# Patient Record
Sex: MALE | Race: BLACK OR AFRICAN AMERICAN | NOT HISPANIC OR LATINO | Employment: OTHER | URBAN - METROPOLITAN AREA
[De-identification: names, ages, dates, MRNs, and addresses within clinical notes are randomized per-mention and may not be internally consistent; named-entity substitution may affect disease eponyms.]

---

## 2023-03-06 LAB — THYROTROPIN (MIU/L) IN SER/PLAS BY DETECTION LIMIT <= 0.05 MIU/L: 3.04 MIU/L (ref 0.44–3.98)

## 2023-04-08 DIAGNOSIS — E78.5 HYPERLIPIDEMIA, UNSPECIFIED HYPERLIPIDEMIA TYPE: ICD-10-CM

## 2023-04-13 PROBLEM — R19.5 POSITIVE COLORECTAL CANCER SCREENING USING DNA-BASED STOOL TEST: Status: ACTIVE | Noted: 2023-04-13

## 2023-04-13 PROBLEM — M79.676 TOE PAIN: Status: ACTIVE | Noted: 2023-04-13

## 2023-04-13 PROBLEM — M79.89 LEG SWELLING: Status: ACTIVE | Noted: 2023-04-13

## 2023-04-13 PROBLEM — M10.9 ACUTE GOUT: Status: ACTIVE | Noted: 2023-04-13

## 2023-04-13 PROBLEM — R55 SYNCOPE AND COLLAPSE: Status: ACTIVE | Noted: 2023-04-13

## 2023-04-13 PROBLEM — J02.9 SORE THROAT: Status: ACTIVE | Noted: 2023-04-13

## 2023-04-13 PROBLEM — R03.0 WHITE COAT SYNDROME WITHOUT HYPERTENSION: Status: ACTIVE | Noted: 2023-04-13

## 2023-04-13 PROBLEM — E05.90 SUBCLINICAL HYPERTHYROIDISM: Status: ACTIVE | Noted: 2023-04-13

## 2023-04-13 PROBLEM — N52.9 ERECTILE DYSFUNCTION: Status: ACTIVE | Noted: 2023-04-13

## 2023-04-13 PROBLEM — D12.6 HIGH GRADE DYSPLASIA IN COLONIC ADENOMA: Status: ACTIVE | Noted: 2023-04-13

## 2023-04-13 PROBLEM — T78.3XXA ANGIOEDEMA OF LIPS: Status: ACTIVE | Noted: 2023-04-13

## 2023-04-13 PROBLEM — E05.10 TOXIC ADENOMA: Status: ACTIVE | Noted: 2023-04-13

## 2023-04-13 PROBLEM — S43.409A SHOULDER SPRAIN: Status: ACTIVE | Noted: 2023-04-13

## 2023-04-13 PROBLEM — R05.9 COUGH: Status: ACTIVE | Noted: 2023-04-13

## 2023-04-13 PROBLEM — M25.551 PAIN IN RIGHT HIP: Status: ACTIVE | Noted: 2023-04-13

## 2023-04-13 PROBLEM — R97.20 ELEVATED PSA: Status: ACTIVE | Noted: 2023-04-13

## 2023-04-13 PROBLEM — E04.1 THYROID NODULE: Status: ACTIVE | Noted: 2023-04-13

## 2023-04-13 PROBLEM — M75.101 ROTATOR CUFF TEAR, RIGHT: Status: ACTIVE | Noted: 2023-04-13

## 2023-04-13 PROBLEM — R03.0 PREHYPERTENSION: Status: ACTIVE | Noted: 2023-04-13

## 2023-04-13 PROBLEM — E78.5 HYPERLIPEMIA: Status: ACTIVE | Noted: 2023-04-13

## 2023-04-13 PROBLEM — I26.99 PULMONARY EMBOLISM (MULTI): Status: ACTIVE | Noted: 2023-04-13

## 2023-04-13 PROBLEM — M25.519 SHOULDER PAIN: Status: ACTIVE | Noted: 2023-04-13

## 2023-04-13 PROBLEM — E55.9 VITAMIN D DEFICIENCY: Status: ACTIVE | Noted: 2023-04-13

## 2023-04-13 PROBLEM — J32.9 SINUSITIS: Status: ACTIVE | Noted: 2023-04-13

## 2023-04-13 PROBLEM — R79.89 LOW TSH LEVEL: Status: ACTIVE | Noted: 2023-04-13

## 2023-04-13 RX ORDER — ATORVASTATIN CALCIUM 20 MG/1
TABLET, FILM COATED ORAL
Qty: 30 TABLET | Refills: 1 | Status: SHIPPED | OUTPATIENT
Start: 2023-04-13 | End: 2023-05-12 | Stop reason: SDUPTHER

## 2023-05-12 DIAGNOSIS — E78.5 HYPERLIPIDEMIA, UNSPECIFIED HYPERLIPIDEMIA TYPE: ICD-10-CM

## 2023-05-12 RX ORDER — ATORVASTATIN CALCIUM 20 MG/1
TABLET, FILM COATED ORAL
Qty: 30 TABLET | Refills: 2 | Status: SHIPPED | OUTPATIENT
Start: 2023-05-12 | End: 2023-08-14

## 2023-05-27 LAB — THYROTROPIN (MIU/L) IN SER/PLAS BY DETECTION LIMIT <= 0.05 MIU/L: 5.48 MIU/L (ref 0.44–3.98)

## 2023-08-14 DIAGNOSIS — E78.5 HYPERLIPIDEMIA, UNSPECIFIED HYPERLIPIDEMIA TYPE: ICD-10-CM

## 2023-08-14 RX ORDER — ATORVASTATIN CALCIUM 20 MG/1
TABLET, FILM COATED ORAL
Qty: 90 TABLET | Refills: 1 | Status: SHIPPED | OUTPATIENT
Start: 2023-08-14 | End: 2024-03-28

## 2023-08-16 LAB — THYROTROPIN (MIU/L) IN SER/PLAS BY DETECTION LIMIT <= 0.05 MIU/L: 3.09 MIU/L (ref 0.44–3.98)

## 2023-12-08 NOTE — PROGRESS NOTES
"follow up for hypothyroidism. LV with me 06/2023.     History of Present Illness   73 y.o. 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     Subclinical hyperthyroidism.  11/15/2022    -NM uptake scan: hot nodule in right lobe    -DIAZ (25 mCi)    Current regimen: levothyroxine 25 mcg/day (started 06/2023)    Labs from 08/2023  TSH 3.06    Labs from 12/2023  TSH 4.52     ROS  General: no fever or chills  CV: no chest pain   Respiratory: no shortness of breath  MSK: no lower extremity edema  Neuro: no headache or dizziness  See HPI for Endocrine ROS    Physical Exam   height is 1.778 m (5' 10\") and weight is 103 kg (228 lb). His blood pressure is 173/76 and his pulse is 97.   General: not in acute distress  HEENT: ARLENE RIZZO  Thyroid: no goiter, no palpable nodules  Neuro: alert and oriented x 3    Current Outpatient Medications   Medication Sig Dispense Refill    atorvastatin (Lipitor) 20 mg tablet TAKE 1 TABLET BY MOUTH EVERY DAY *PLEASE FOLLOW UP FOR FURTHER REFILLS* 90 tablet 1    levothyroxine (Synthroid, Levoxyl) 50 mcg tablet Take 1 tablet (50 mcg) by mouth once daily. 90 tablet 1     No current facility-administered medications for this visit.       Assessment and Plan  73 y.o. male with hx of subclinical hyperthyroidism 2/2 toxic adenoma s/p DIAZ (11/15/2022) and post-ablative hypothyroidism     1. Post-ablative hypothyroidism  2. Hx of toxic adenoma   TSH: intermittently low since 201, most recently 0.05 in May 2022  10/11/2022  Thyroid US  Right lobe: 3.5 x 2.5 x 2.8 cm, hypoechoic mostly solid  Left lobe: cysts   11/15/2022  -NM uptake scan: large focus of increased uptake in right lobe corresponding to large right lobe nodule on US   -DIAZ (25 mCi)      Current regimen: levothyroxine 25 mcg/day (since 06/2023)    Labs from 5/27/2023 (first elevated TSH after DIAZ)  TSH 5.48     Labs from 08/2023  TSH 3.06    Labs from 12/2023  TSH 4.52    Slightly hypothyroid. Will " increase levothyroxine.    PLAN:  -increase levothyroxine to 50 mcg/day  -repeat TSH in 2 months  -obtain thyroid US     Follow-up in 6 months   Uses [employer].

## 2023-12-11 ENCOUNTER — LAB (OUTPATIENT)
Dept: LAB | Facility: LAB | Age: 73
End: 2023-12-11
Payer: MEDICARE

## 2023-12-11 DIAGNOSIS — E89.0 POSTABLATIVE HYPOTHYROIDISM: ICD-10-CM

## 2023-12-11 LAB — TSH SERPL-ACNC: 4.52 MIU/L (ref 0.44–3.98)

## 2023-12-11 PROCEDURE — 36415 COLL VENOUS BLD VENIPUNCTURE: CPT

## 2023-12-11 PROCEDURE — 84443 ASSAY THYROID STIM HORMONE: CPT

## 2023-12-14 ENCOUNTER — OFFICE VISIT (OUTPATIENT)
Dept: ENDOCRINOLOGY | Facility: CLINIC | Age: 73
End: 2023-12-14
Payer: MEDICARE

## 2023-12-14 VITALS
WEIGHT: 228 LBS | HEART RATE: 97 BPM | HEIGHT: 70 IN | DIASTOLIC BLOOD PRESSURE: 76 MMHG | SYSTOLIC BLOOD PRESSURE: 173 MMHG | BODY MASS INDEX: 32.64 KG/M2

## 2023-12-14 DIAGNOSIS — E04.1 THYROID NODULE: ICD-10-CM

## 2023-12-14 DIAGNOSIS — E89.0 POSTPROCEDURAL HYPOTHYROIDISM: ICD-10-CM

## 2023-12-14 DIAGNOSIS — E89.0 POSTABLATIVE HYPOTHYROIDISM: Primary | ICD-10-CM

## 2023-12-14 PROCEDURE — 1126F AMNT PAIN NOTED NONE PRSNT: CPT | Performed by: STUDENT IN AN ORGANIZED HEALTH CARE EDUCATION/TRAINING PROGRAM

## 2023-12-14 PROCEDURE — 3078F DIAST BP <80 MM HG: CPT | Performed by: STUDENT IN AN ORGANIZED HEALTH CARE EDUCATION/TRAINING PROGRAM

## 2023-12-14 PROCEDURE — 3077F SYST BP >= 140 MM HG: CPT | Performed by: STUDENT IN AN ORGANIZED HEALTH CARE EDUCATION/TRAINING PROGRAM

## 2023-12-14 PROCEDURE — 1159F MED LIST DOCD IN RCRD: CPT | Performed by: STUDENT IN AN ORGANIZED HEALTH CARE EDUCATION/TRAINING PROGRAM

## 2023-12-14 PROCEDURE — 99214 OFFICE O/P EST MOD 30 MIN: CPT | Performed by: STUDENT IN AN ORGANIZED HEALTH CARE EDUCATION/TRAINING PROGRAM

## 2023-12-14 RX ORDER — LEVOTHYROXINE SODIUM 50 UG/1
50 TABLET ORAL DAILY
Qty: 90 TABLET | Refills: 1 | Status: SHIPPED | OUTPATIENT
Start: 2023-12-14 | End: 2024-05-20

## 2023-12-14 NOTE — PATIENT INSTRUCTIONS
Increase levothyroxine to 50 micrograms, once a day.  You can 2 tablets of the 25 micrograms pills until you are finished. The new prescription I sent for you today will be 50 microgram tablets, so you will only need to take 1 tablet of this every morning.  Please blood work done in about 2 months (mid February)  Please schedule thyroid ultrasound    [facility]: Main: [phone]

## 2024-02-12 ENCOUNTER — LAB (OUTPATIENT)
Dept: LAB | Facility: LAB | Age: 74
End: 2024-02-12
Payer: MEDICARE

## 2024-02-12 DIAGNOSIS — E89.0 POSTABLATIVE HYPOTHYROIDISM: ICD-10-CM

## 2024-02-12 LAB — TSH SERPL-ACNC: 3.3 MIU/L (ref 0.44–3.98)

## 2024-02-12 PROCEDURE — 36415 COLL VENOUS BLD VENIPUNCTURE: CPT

## 2024-02-12 PROCEDURE — 84443 ASSAY THYROID STIM HORMONE: CPT

## 2024-02-15 ENCOUNTER — HOSPITAL ENCOUNTER (OUTPATIENT)
Dept: RADIOLOGY | Facility: CLINIC | Age: 74
Discharge: HOME | End: 2024-02-15
Payer: MEDICARE

## 2024-02-15 DIAGNOSIS — E04.1 THYROID NODULE: ICD-10-CM

## 2024-02-15 PROCEDURE — 76536 US EXAM OF HEAD AND NECK: CPT

## 2024-02-15 PROCEDURE — 76536 US EXAM OF HEAD AND NECK: CPT | Performed by: STUDENT IN AN ORGANIZED HEALTH CARE EDUCATION/TRAINING PROGRAM

## 2024-02-16 DIAGNOSIS — E78.5 HYPERLIPIDEMIA, UNSPECIFIED HYPERLIPIDEMIA TYPE: ICD-10-CM

## 2024-03-28 RX ORDER — ATORVASTATIN CALCIUM 20 MG/1
TABLET, FILM COATED ORAL
Qty: 90 TABLET | Refills: 1 | Status: SHIPPED | OUTPATIENT
Start: 2024-03-28

## 2024-05-19 DIAGNOSIS — E89.0 POSTABLATIVE HYPOTHYROIDISM: ICD-10-CM

## 2024-05-20 RX ORDER — LEVOTHYROXINE SODIUM 50 UG/1
50 TABLET ORAL DAILY
Qty: 90 TABLET | Refills: 0 | Status: SHIPPED | OUTPATIENT
Start: 2024-05-20 | End: 2024-06-05 | Stop reason: SDUPTHER

## 2024-05-30 NOTE — PROGRESS NOTES
"follow up for hypothyroidism. LV with me 12/2023.     History of Present Illness   73 y.o. 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     Subclinical hyperthyroidism.  11/15/2022  -NM uptake scan: hot nodule in right lobe  -DIAZ (25 mCi)    Current regimen: levothyroxine 50 mcg/day    Today  -doing well  -still playing [employer] ball regularly    ROS  General: no fever or chills  CV: no chest pain   Respiratory: no shortness of breath  MSK: no lower extremity edema  Neuro: no headache or dizziness  See HPI for Endocrine ROS    Physical Exam   height is 1.778 m (5' 10\") and weight is 105 kg (231 lb). His blood pressure is 144/73 and his pulse is 78.   General: not in acute distress  HEENT: ARLENE RIZZO  Thyroid: no goiter, no palpable nodules  Neuro: alert and oriented x 3    Current Outpatient Medications   Medication Sig Dispense Refill    atorvastatin (Lipitor) 20 mg tablet TAKE 1 TABLET BY MOUTH EVERY DAY *PLEASE FOLLOW UP FOR FURTHER REFILLS* 90 tablet 1    levothyroxine (Synthroid, Levoxyl) 50 mcg tablet TAKE 1 TABLET BY MOUTH ONCE DAILY. 90 tablet 0     No current facility-administered medications for this visit.       Assessment and Plan  73 y.o. male with hx of subclinical hyperthyroidism 2/2 toxic adenoma s/p DIAZ (11/15/2022) and post-ablative hypothyroidism     1. Post-ablative hypothyroidism  2. Hx of toxic adenoma   TSH: intermittently low since 201, most recently 0.05 in May 2022  Thyroid US (10/2022)  Right lobe:   -interpolar; 3.5 x 2.5 x 2.8 cm, hypoechoic mostly solid (hot nodule)  -interpolar; 0.8 x 0.7 x 0.8 cm, hypoechoic, solid  Left lobe: cysts   11/15/2022  -NM uptake scan: large focus of increased uptake in right lobe corresponding to large right lobe nodule on US   -DIAZ (25 mCi)     Current regimen: levothyroxine 50 mcg/day (since 12/2023)    Thyroid US (02/2024):  Right:  -interpolar; 2.3 x 2.1 x 2.0 cm, hypoechoic, solid (hot nodule)  -interpolar; 0.8 x " 0.8 x 0.8 cm, hypoechoic, solid    Labs from 12/2023  TSH 4.52  *increased LT4 from 25 mcg/day to 50 mcg/day    Labs from 02/2024  TSH 3.30    Asymptomatic and doing well.   Will check TSH in August.    PLAN:  -continue levothyroxine 50 mcg/day  -repeat TSH in 2 months (August)  -repeat thyroid US in 02/2025     Follow-up in 8 months  Uses [email or web address].

## 2024-06-05 ENCOUNTER — OFFICE VISIT (OUTPATIENT)
Dept: ENDOCRINOLOGY | Facility: CLINIC | Age: 74
End: 2024-06-05
Payer: MEDICARE

## 2024-06-05 VITALS
BODY MASS INDEX: 33.07 KG/M2 | HEART RATE: 78 BPM | SYSTOLIC BLOOD PRESSURE: 144 MMHG | HEIGHT: 70 IN | WEIGHT: 231 LBS | DIASTOLIC BLOOD PRESSURE: 73 MMHG

## 2024-06-05 DIAGNOSIS — E89.0 POSTABLATIVE HYPOTHYROIDISM: ICD-10-CM

## 2024-06-05 PROCEDURE — 3078F DIAST BP <80 MM HG: CPT | Performed by: STUDENT IN AN ORGANIZED HEALTH CARE EDUCATION/TRAINING PROGRAM

## 2024-06-05 PROCEDURE — 99214 OFFICE O/P EST MOD 30 MIN: CPT | Performed by: STUDENT IN AN ORGANIZED HEALTH CARE EDUCATION/TRAINING PROGRAM

## 2024-06-05 PROCEDURE — 3077F SYST BP >= 140 MM HG: CPT | Performed by: STUDENT IN AN ORGANIZED HEALTH CARE EDUCATION/TRAINING PROGRAM

## 2024-06-05 PROCEDURE — 1159F MED LIST DOCD IN RCRD: CPT | Performed by: STUDENT IN AN ORGANIZED HEALTH CARE EDUCATION/TRAINING PROGRAM

## 2024-06-05 RX ORDER — LEVOTHYROXINE SODIUM 50 UG/1
50 TABLET ORAL DAILY
Qty: 90 TABLET | Refills: 3 | Status: SHIPPED | OUTPATIENT
Start: 2024-06-05

## 2024-06-05 NOTE — PATIENT INSTRUCTIONS
Please have blood work done in early August for your thyroid  Please schedule thyroid ultrasound for end of January (will discuss results with you at your February appointment)    Scheduling for the ultrasound: [phone]

## 2024-06-19 ENCOUNTER — APPOINTMENT (OUTPATIENT)
Dept: ENDOCRINOLOGY | Facility: CLINIC | Age: 74
End: 2024-06-19
Payer: MEDICARE

## 2024-08-31 ENCOUNTER — LAB (OUTPATIENT)
Dept: LAB | Facility: LAB | Age: 74
End: 2024-08-31
Payer: MEDICARE

## 2024-08-31 DIAGNOSIS — E89.0 POSTABLATIVE HYPOTHYROIDISM: ICD-10-CM

## 2024-08-31 LAB — TSH SERPL-ACNC: 4.21 MIU/L (ref 0.44–3.98)

## 2024-08-31 PROCEDURE — 36415 COLL VENOUS BLD VENIPUNCTURE: CPT

## 2024-08-31 PROCEDURE — 84443 ASSAY THYROID STIM HORMONE: CPT

## 2024-09-03 DIAGNOSIS — E89.0 POSTABLATIVE HYPOTHYROIDISM: Primary | ICD-10-CM

## 2024-09-03 RX ORDER — LEVOTHYROXINE SODIUM 75 UG/1
TABLET ORAL
Qty: 90 TABLET | Refills: 3 | Status: SHIPPED | OUTPATIENT
Start: 2024-09-03

## 2024-11-16 ENCOUNTER — LAB (OUTPATIENT)
Dept: LAB | Facility: LAB | Age: 74
End: 2024-11-16
Payer: MEDICARE

## 2024-11-16 DIAGNOSIS — E89.0 POSTABLATIVE HYPOTHYROIDISM: ICD-10-CM

## 2024-11-16 LAB — TSH SERPL-ACNC: 2.79 MIU/L (ref 0.44–3.98)

## 2024-11-16 PROCEDURE — 84550 ASSAY OF BLOOD/URIC ACID: CPT

## 2024-11-16 PROCEDURE — 84443 ASSAY THYROID STIM HORMONE: CPT

## 2024-11-16 PROCEDURE — 36415 COLL VENOUS BLD VENIPUNCTURE: CPT

## 2024-11-16 PROCEDURE — 82306 VITAMIN D 25 HYDROXY: CPT

## 2024-11-16 PROCEDURE — G0103 PSA SCREENING: HCPCS

## 2024-11-16 PROCEDURE — 80053 COMPREHEN METABOLIC PANEL: CPT

## 2024-11-19 ENCOUNTER — APPOINTMENT (OUTPATIENT)
Dept: PRIMARY CARE | Facility: CLINIC | Age: 74
End: 2024-11-19
Payer: MEDICARE

## 2024-11-19 VITALS
SYSTOLIC BLOOD PRESSURE: 150 MMHG | BODY MASS INDEX: 32.64 KG/M2 | HEIGHT: 70 IN | WEIGHT: 228 LBS | TEMPERATURE: 97.9 F | DIASTOLIC BLOOD PRESSURE: 80 MMHG | HEART RATE: 96 BPM

## 2024-11-19 DIAGNOSIS — N50.89 SCROTUM SWELLING: ICD-10-CM

## 2024-11-19 DIAGNOSIS — R53.81 MALAISE: ICD-10-CM

## 2024-11-19 DIAGNOSIS — I26.99 OTHER PULMONARY EMBOLISM WITHOUT ACUTE COR PULMONALE, UNSPECIFIED CHRONICITY (MULTI): ICD-10-CM

## 2024-11-19 DIAGNOSIS — Z12.5 SCREENING PSA (PROSTATE SPECIFIC ANTIGEN): ICD-10-CM

## 2024-11-19 DIAGNOSIS — E55.9 VITAMIN D DEFICIENCY: ICD-10-CM

## 2024-11-19 DIAGNOSIS — E78.2 MIXED HYPERLIPIDEMIA: ICD-10-CM

## 2024-11-19 DIAGNOSIS — E78.5 HYPERLIPIDEMIA, UNSPECIFIED HYPERLIPIDEMIA TYPE: ICD-10-CM

## 2024-11-19 DIAGNOSIS — Z12.11 SCREENING FOR COLON CANCER: Primary | ICD-10-CM

## 2024-11-19 DIAGNOSIS — R97.20 ELEVATED PSA: ICD-10-CM

## 2024-11-19 DIAGNOSIS — R19.5 POSITIVE COLORECTAL CANCER SCREENING USING DNA-BASED STOOL TEST: ICD-10-CM

## 2024-11-19 DIAGNOSIS — J30.9 ALLERGIC RHINITIS, UNSPECIFIED SEASONALITY, UNSPECIFIED TRIGGER: ICD-10-CM

## 2024-11-19 DIAGNOSIS — R03.0 WHITE COAT SYNDROME WITHOUT HYPERTENSION: ICD-10-CM

## 2024-11-19 LAB
25(OH)D3 SERPL-MCNC: 34 NG/ML (ref 30–100)
ALBUMIN SERPL BCP-MCNC: 4.1 G/DL (ref 3.4–5)
ALP SERPL-CCNC: 74 U/L (ref 33–136)
ALT SERPL W P-5'-P-CCNC: 20 U/L (ref 10–52)
ANION GAP SERPL CALC-SCNC: 20 MMOL/L (ref 10–20)
AST SERPL W P-5'-P-CCNC: 43 U/L (ref 9–39)
BILIRUB SERPL-MCNC: 1 MG/DL (ref 0–1.2)
BUN SERPL-MCNC: 12 MG/DL (ref 6–23)
CALCIUM SERPL-MCNC: 9.1 MG/DL (ref 8.6–10.6)
CHLORIDE SERPL-SCNC: 102 MMOL/L (ref 98–107)
CO2 SERPL-SCNC: 25 MMOL/L (ref 21–32)
CREAT SERPL-MCNC: 1.03 MG/DL (ref 0.5–1.3)
EGFRCR SERPLBLD CKD-EPI 2021: 77 ML/MIN/1.73M*2
GLUCOSE SERPL-MCNC: 77 MG/DL (ref 74–99)
POTASSIUM SERPL-SCNC: 4 MMOL/L (ref 3.5–5.3)
PROT SERPL-MCNC: 7.1 G/DL (ref 6.4–8.2)
PSA SERPL-MCNC: 7.13 NG/ML
SODIUM SERPL-SCNC: 143 MMOL/L (ref 136–145)
URATE SERPL-MCNC: 7 MG/DL (ref 4–7.5)

## 2024-11-19 PROCEDURE — 3008F BODY MASS INDEX DOCD: CPT | Performed by: INTERNAL MEDICINE

## 2024-11-19 PROCEDURE — 1124F ACP DISCUSS-NO DSCNMKR DOCD: CPT | Performed by: INTERNAL MEDICINE

## 2024-11-19 PROCEDURE — 1159F MED LIST DOCD IN RCRD: CPT | Performed by: INTERNAL MEDICINE

## 2024-11-19 PROCEDURE — G0438 PPPS, INITIAL VISIT: HCPCS | Performed by: INTERNAL MEDICINE

## 2024-11-19 PROCEDURE — 1170F FXNL STATUS ASSESSED: CPT | Performed by: INTERNAL MEDICINE

## 2024-11-19 PROCEDURE — 3077F SYST BP >= 140 MM HG: CPT | Performed by: INTERNAL MEDICINE

## 2024-11-19 PROCEDURE — 1160F RVW MEDS BY RX/DR IN RCRD: CPT | Performed by: INTERNAL MEDICINE

## 2024-11-19 PROCEDURE — 3079F DIAST BP 80-89 MM HG: CPT | Performed by: INTERNAL MEDICINE

## 2024-11-19 PROCEDURE — 99215 OFFICE O/P EST HI 40 MIN: CPT | Performed by: INTERNAL MEDICINE

## 2024-11-19 RX ORDER — FLUTICASONE PROPIONATE 50 MCG
1 SPRAY, SUSPENSION (ML) NASAL DAILY
Qty: 16 G | Refills: 11 | Status: SHIPPED | OUTPATIENT
Start: 2024-11-19 | End: 2025-11-19

## 2024-11-19 RX ORDER — ATORVASTATIN CALCIUM 20 MG/1
20 TABLET, FILM COATED ORAL DAILY
Qty: 90 TABLET | Refills: 3 | Status: SHIPPED | OUTPATIENT
Start: 2024-11-19

## 2024-11-19 SDOH — HEALTH STABILITY: PHYSICAL HEALTH: ON AVERAGE, HOW MANY MINUTES DO YOU ENGAGE IN EXERCISE AT THIS LEVEL?: 60 MIN

## 2024-11-19 SDOH — HEALTH STABILITY: PHYSICAL HEALTH: ON AVERAGE, HOW MANY DAYS PER WEEK DO YOU ENGAGE IN MODERATE TO STRENUOUS EXERCISE (LIKE A BRISK WALK)?: 3 DAYS

## 2024-11-19 ASSESSMENT — ENCOUNTER SYMPTOMS
CHOKING: 0
SORE THROAT: 0
RHINORRHEA: 0
NUMBNESS: 0
DIZZINESS: 0
WHEEZING: 0
NAUSEA: 0
COLOR CHANGE: 0
DIARRHEA: 0
TROUBLE SWALLOWING: 0
BACK PAIN: 0
SEIZURES: 0
SHORTNESS OF BREATH: 0
APPETITE CHANGE: 0
POLYDIPSIA: 0
FREQUENCY: 0
NECK STIFFNESS: 0
EYE ITCHING: 0
NECK PAIN: 0
BRUISES/BLEEDS EASILY: 0
PALPITATIONS: 0
HEADACHES: 0
TREMORS: 0
JOINT SWELLING: 0
CHILLS: 0
WEAKNESS: 0
STRIDOR: 0
SINUS PRESSURE: 0
RECTAL PAIN: 0
ABDOMINAL PAIN: 0
POLYPHAGIA: 0
SLEEP DISTURBANCE: 0
MYALGIAS: 0
BLOOD IN STOOL: 0
FLANK PAIN: 0
EYE PAIN: 0
HEMATURIA: 0
CHEST TIGHTNESS: 0
CONSTIPATION: 0
VOICE CHANGE: 0
EYE DISCHARGE: 0
ABDOMINAL DISTENTION: 0
VOMITING: 0
DIAPHORESIS: 0
ANAL BLEEDING: 0
PHOTOPHOBIA: 0
ACTIVITY CHANGE: 0
COUGH: 0
FACIAL SWELLING: 0
DIFFICULTY URINATING: 0
ARTHRALGIAS: 0
FATIGUE: 0
SPEECH DIFFICULTY: 0
EYE REDNESS: 0
FACIAL ASYMMETRY: 0
SINUS PAIN: 0
WOUND: 0
LIGHT-HEADEDNESS: 0
ADENOPATHY: 0
DYSURIA: 0

## 2024-11-19 ASSESSMENT — ACTIVITIES OF DAILY LIVING (ADL)
DRESSING: INDEPENDENT
TAKING_MEDICATION: INDEPENDENT
MANAGING_FINANCES: INDEPENDENT
BATHING: INDEPENDENT
GROCERY_SHOPPING: INDEPENDENT
DOING_HOUSEWORK: INDEPENDENT

## 2024-11-19 ASSESSMENT — PATIENT HEALTH QUESTIONNAIRE - PHQ9
2. FEELING DOWN, DEPRESSED OR HOPELESS: NOT AT ALL
SUM OF ALL RESPONSES TO PHQ9 QUESTIONS 1 AND 2: 0
1. LITTLE INTEREST OR PLEASURE IN DOING THINGS: NOT AT ALL

## 2024-11-19 ASSESSMENT — LIFESTYLE VARIABLES
HOW OFTEN DO YOU HAVE A DRINK CONTAINING ALCOHOL: 2-3 TIMES A WEEK
HOW MANY STANDARD DRINKS CONTAINING ALCOHOL DO YOU HAVE ON A TYPICAL DAY: 1 OR 2
HOW OFTEN DO YOU HAVE SIX OR MORE DRINKS ON ONE OCCASION: NEVER
AUDIT-C TOTAL SCORE: 3
SKIP TO QUESTIONS 9-10: 1

## 2024-11-19 NOTE — PROGRESS NOTES
Subjective   Patient ID: Wil Dinero is a 74 y.o. male who presents for Medicare Annual Wellness Visit Subsequent (Pt present today for wellness visit. ls).    Patient presents for wellness exam and follow-up.  He has been out of his cholesterol medications.  He has been compliant with his diet and exercise.  He has been complaining of right scrotum swelling over the past few months.  He denies any pain.  He otherwise feels okay.  He denies any headaches, no dizziness but does complain of allergy symptoms.  He denies any chest pain or shortness of breath, no abdominal pain no nausea vomiting or diarrhea.  He reports no new musculoskeletal complaints.  He reports his blood pressure has been normal at home         Review of Systems   Constitutional:  Negative for activity change, appetite change, chills, diaphoresis and fatigue.   HENT:  Negative for congestion, dental problem, drooling, ear discharge, ear pain, facial swelling, hearing loss, mouth sores, nosebleeds, postnasal drip, rhinorrhea, sinus pressure, sinus pain, sneezing, sore throat, tinnitus, trouble swallowing and voice change.    Eyes:  Negative for photophobia, pain, discharge, redness, itching and visual disturbance.   Respiratory:  Negative for cough, choking, chest tightness, shortness of breath, wheezing and stridor.    Cardiovascular:  Negative for chest pain, palpitations and leg swelling.   Gastrointestinal:  Negative for abdominal distention, abdominal pain, anal bleeding, blood in stool, constipation, diarrhea, nausea, rectal pain and vomiting.   Endocrine: Negative for cold intolerance, heat intolerance, polydipsia, polyphagia and polyuria.   Genitourinary:  Negative for decreased urine volume, difficulty urinating, dysuria, enuresis, flank pain, frequency, genital sores, hematuria and urgency.   Musculoskeletal:  Negative for arthralgias, back pain, gait problem, joint swelling, myalgias, neck pain and neck stiffness.   Skin:  Negative  "for color change, pallor, rash and wound.   Neurological:  Negative for dizziness, tremors, seizures, syncope, facial asymmetry, speech difficulty, weakness, light-headedness, numbness and headaches.   Hematological:  Negative for adenopathy. Does not bruise/bleed easily.   Psychiatric/Behavioral:  Negative for sleep disturbance.        Objective   /80   Pulse 96   Temp 36.6 °C (97.9 °F)   Ht 1.778 m (5' 10\")   Wt 103 kg (228 lb)   BMI 32.71 kg/m²     Physical Exam  Constitutional:       Appearance: Normal appearance.   Cardiovascular:      Rate and Rhythm: Normal rate and regular rhythm.      Heart sounds: No murmur heard.     No gallop.   Pulmonary:      Effort: No respiratory distress.      Breath sounds: No wheezing or rales.   Abdominal:      General: There is no distension.      Palpations: There is no mass.      Tenderness: There is no abdominal tenderness. There is no guarding.   Genitourinary:     Comments: Large swelling in r scrotum  Musculoskeletal:      Right lower leg: Edema (Trace edema) present.      Left lower leg: Edema (Trace edema) present.   Neurological:      Mental Status: He is alert.         Assessment/Plan   Diagnoses and all orders for this visit:  Screening for colon cancer  -     Colonoscopy Screening; Average Risk Patient; Future  Other pulmonary embolism without acute cor pulmonale, unspecified chronicity (Multi)  -     US scrotum; Future  Allergic rhinitis, unspecified seasonality, unspecified trigger  -     fluticasone (Flonase) 50 mcg/actuation nasal spray; Administer 1 spray into each nostril once daily. Shake gently. Before first use, prime pump. After use, clean tip and replace cap.  Mixed hyperlipidemia-check a fasting lipid profile.  Schedule cardiac score  -     CT cardiac scoring wo IV contrast; Future  -     Lipid Panel; Future  -     atorvastatin (Lipitor) 20 mg tablet; Take 1 tablet (20 mg) by mouth once daily.  Screening PSA (prostate specific antigen)  -     " Prostate Specific Antigen; Future  Malaise  -     Albumin-Creatinine Ratio, Urine Random; Future  -     CBC and Auto Differential; Future  -     Comprehensive Metabolic Panel; Future  -     Uric Acid; Future  -     Urinalysis with Reflex Microscopic; Future  Vitamin D deficiency  -     Vitamin D 25-Hydroxy,Total (for eval of Vitamin D levels); Future  Scrotum swelling-we will obtain an ultrasound.  White coat syndrome without hypertension-he will bring his blood pressure cuff to the next visit.  Hyperlipidemia, unspecified hyperlipidemia type  -     atorvastatin (Lipitor) 20 mg tablet; Take 1 tablet (20 mg) by mouth once daily.  Positive colorectal cancer screening using DNA-based stool test-he will schedule colonoscopy.  Risk explained of noncompliance  Elevated PSA-will recheck today.  Health maintenance-he will schedule colonoscopy.  He will get his shingles and tetanus shot at the pharmacy.  He will schedule dental appointment.  Eye appointment has been done.

## 2024-11-19 NOTE — PATIENT INSTRUCTIONS
Please take medication as prescribed.  Follow-up in 6 weeks.  Obtain blood work.  Schedule your colonoscopy.  Schedule an ultrasound.  Schedule your cardiac score.  Schedule eye and dental appointment

## 2024-11-20 ENCOUNTER — HOSPITAL ENCOUNTER (OUTPATIENT)
Dept: RADIOLOGY | Facility: CLINIC | Age: 74
Discharge: HOME | End: 2024-11-20
Payer: MEDICARE

## 2024-11-20 DIAGNOSIS — N50.89 SCROTUM SWELLING: ICD-10-CM

## 2024-11-20 PROCEDURE — 76870 US EXAM SCROTUM: CPT

## 2024-11-20 PROCEDURE — 76870 US EXAM SCROTUM: CPT | Performed by: RADIOLOGY

## 2024-11-22 DIAGNOSIS — K40.90 INGUINAL HERNIA WITHOUT OBSTRUCTION OR GANGRENE, RECURRENCE NOT SPECIFIED, UNSPECIFIED LATERALITY: ICD-10-CM

## 2024-11-22 DIAGNOSIS — R97.20 ELEVATED PSA: Primary | ICD-10-CM

## 2024-12-12 ENCOUNTER — OFFICE VISIT (OUTPATIENT)
Dept: SURGERY | Facility: CLINIC | Age: 74
End: 2024-12-12
Payer: MEDICARE

## 2024-12-12 VITALS
HEART RATE: 114 BPM | DIASTOLIC BLOOD PRESSURE: 89 MMHG | BODY MASS INDEX: 32.64 KG/M2 | WEIGHT: 228 LBS | SYSTOLIC BLOOD PRESSURE: 169 MMHG | HEIGHT: 70 IN

## 2024-12-12 DIAGNOSIS — K40.90 INGUINAL HERNIA WITHOUT OBSTRUCTION OR GANGRENE, RECURRENCE NOT SPECIFIED, UNSPECIFIED LATERALITY: Primary | ICD-10-CM

## 2024-12-12 PROCEDURE — 99213 OFFICE O/P EST LOW 20 MIN: CPT | Performed by: SURGERY

## 2024-12-12 PROCEDURE — 3077F SYST BP >= 140 MM HG: CPT | Performed by: SURGERY

## 2024-12-12 PROCEDURE — 99203 OFFICE O/P NEW LOW 30 MIN: CPT | Performed by: SURGERY

## 2024-12-12 PROCEDURE — 3008F BODY MASS INDEX DOCD: CPT | Performed by: SURGERY

## 2024-12-12 PROCEDURE — 1126F AMNT PAIN NOTED NONE PRSNT: CPT | Performed by: SURGERY

## 2024-12-12 PROCEDURE — 1159F MED LIST DOCD IN RCRD: CPT | Performed by: SURGERY

## 2024-12-12 PROCEDURE — 3079F DIAST BP 80-89 MM HG: CPT | Performed by: SURGERY

## 2024-12-12 ASSESSMENT — ENCOUNTER SYMPTOMS
DIARRHEA: 0
CHILLS: 0
DYSURIA: 0
NAUSEA: 0
SHORTNESS OF BREATH: 0
DIFFICULTY URINATING: 0
ABDOMINAL DISTENTION: 0
DEPRESSION: 0
HEMATURIA: 0
ABDOMINAL PAIN: 0
VOMITING: 0
FEVER: 0

## 2024-12-12 ASSESSMENT — PAIN SCALES - GENERAL: PAINLEVEL_OUTOF10: 0-NO PAIN

## 2024-12-12 NOTE — PROGRESS NOTES
"Subjective   Patient ID: Wil Dinero is a 74 y.o. male who was referred by his primary care physician for a right inguinal hernia.    Patient first started to notice swelling in his scrotum one year ago. Reports that it the swelling varies in size and is painless.    He was sent for a scrotal ultrasound that shows a large right inguinal hernia  with loop of bowel incarcerated into the scrotum.       Review of Systems   Constitutional:  Negative for chills and fever.   Respiratory:  Negative for shortness of breath.    Cardiovascular:  Negative for chest pain.   Gastrointestinal:  Negative for abdominal distention, abdominal pain, diarrhea, nausea and vomiting.   Genitourinary:  Positive for scrotal swelling. Negative for difficulty urinating, dysuria and hematuria.         Past Medical History:   Diagnosis Date    Essential (primary) hypertension 10/18/2018    Benign essential hypertension    Personal history of other diseases of male genital organs 03/13/2019    History of erectile dysfunction    Personal history of other diseases of the circulatory system 10/18/2017    History of abdominal aortic aneurysm (AAA)        Past Surgical History:   Procedure Laterality Date    COLONOSCOPY  03/22/2017    Complete Colonoscopy    OTHER SURGICAL HISTORY  09/23/2016    Anal Surgery        Allergies: No Known Allergies     Social: He reports that he has never smoked. He reports 1-2 alcohol drinks a week. He reports that he does not use drugs.      Objective     IMAGING:  US Scrotum 11/19/2024: \"Appearance of a right-sided inguinal hernia which appears to contain  bowel and enters the right-sided testicular sac. Small bilateral hydroceles. Small bilateral epididymal cysts\"    Physical Exam  Constitutional:       General: He is not in acute distress.     Appearance: He is not ill-appearing.   Cardiovascular:      Rate and Rhythm: Normal rate.      Heart sounds: Normal heart sounds. No murmur heard.  Pulmonary:      " Effort: Pulmonary effort is normal. No respiratory distress.      Breath sounds: No wheezing or rhonchi.   Abdominal:      General: Bowel sounds are normal. There is no distension.      Palpations: Abdomen is soft.      Tenderness: There is no abdominal tenderness. There is no guarding.   Genitourinary:     Comments: Large scrotal swelling present with a right sided, partially reducible hernia.   Skin:     General: Skin is warm and dry.   Neurological:      Mental Status: He is alert and oriented to person, place, and time.     Very large right inguinal hernia that extends into the scrotum. Difficult to reduce.  No hernia noted on the left side     Assessment/Plan   Diagnoses and all orders for this visit:  Inguinal hernia without obstruction or gangrene, recurrence not specified, unspecified laterality  -     Referral to General Surgery  -     Case Request Operating Room: Repair Inguinal Hernia Robot-Assisted; Standing  -     Request for Pre-Admission Testing Visit; Future  Other orders  -     Place in outpatient/hospital ambulatory surgery; Standing  -     Full code; Standing  -     NPO Diet Except: Sips with meds; Effective now; Standing  -     Height and weight; Standing  -     Insert and maintain peripheral IV; Standing  -     Saline lock IV; Standing  -     Type And Screen; Standing  -     Inpatient consult to Respiratory Care; Standing  -     Full code; Standing  -     NPO Diet Except: Sips with meds; Effective now; Standing  -     Height and weight; Standing  -     Insert and maintain peripheral IV; Standing  -     Saline lock IV; Standing    Impression; Very large right inguinal hernia that extends into the scrotum.  I have recommended robotic right inguinal hernia repair with mesh.  We discussed the procedure to include risk benefits and alternatives.  He agrees to the operation which is tentatively scheduled for early next year

## 2024-12-12 NOTE — LETTER
December 12, 2024     Fletcher Estrella MD  [address] [facility] [address]    Patient: Wil Dinero   YOB: 1950   Date of Visit: 12/12/2024       Dear Dr. Fletcher Esrtella MD:    Thank you for referring Wil Dinero to me for evaluation. Below are my notes for this consultation.  If you have questions, please do not hesitate to call me. I look forward to following your patient along with you.       Sincerely,     Brian Bates MD      CC: No Recipients  ______________________________________________________________________________________    Subjective  Patient ID: Wil Dinero is a 74 y.o. male who was referred by his primary care physician for a right inguinal hernia.    Patient first started to notice swelling in his scrotum one year ago. Reports that it the swelling varies in size and is painless.    He was sent for a scrotal ultrasound that shows a large right inguinal hernia  with loop of bowel incarcerated into the scrotum.       Review of Systems   Constitutional:  Negative for chills and fever.   Respiratory:  Negative for shortness of breath.    Cardiovascular:  Negative for chest pain.   Gastrointestinal:  Negative for abdominal distention, abdominal pain, diarrhea, nausea and vomiting.   Genitourinary:  Positive for scrotal swelling. Negative for difficulty urinating, dysuria and hematuria.         Past Medical History:   Diagnosis Date   • Essential (primary) hypertension 10/18/2018    Benign essential hypertension   • Personal history of other diseases of male genital organs 03/13/2019    History of erectile dysfunction   • Personal history of other diseases of the circulatory system 10/18/2017    History of abdominal aortic aneurysm (AAA)        Past Surgical History:   Procedure Laterality Date   • COLONOSCOPY  03/22/2017    Complete Colonoscopy   • OTHER SURGICAL HISTORY  09/23/2016    Anal Surgery        Allergies: No Known Allergies     Social: He reports that  "he has never smoked. He reports 1-2 alcohol drinks a week. He reports that he does not use drugs.      Objective    IMAGING:  US Scrotum 11/19/2024: \"Appearance of a right-sided inguinal hernia which appears to contain  bowel and enters the right-sided testicular sac. Small bilateral hydroceles. Small bilateral epididymal cysts\"    Physical Exam  Constitutional:       General: He is not in acute distress.     Appearance: He is not ill-appearing.   Cardiovascular:      Rate and Rhythm: Normal rate.      Heart sounds: Normal heart sounds. No murmur heard.  Pulmonary:      Effort: Pulmonary effort is normal. No respiratory distress.      Breath sounds: No wheezing or rhonchi.   Abdominal:      General: Bowel sounds are normal. There is no distension.      Palpations: Abdomen is soft.      Tenderness: There is no abdominal tenderness. There is no guarding.   Genitourinary:     Comments: Large scrotal swelling present with a right sided, partially reducible hernia.   Skin:     General: Skin is warm and dry.   Neurological:      Mental Status: He is alert and oriented to person, place, and time.     Very large right inguinal hernia that extends into the scrotum. Difficult to reduce.  No hernia noted on the left side     Assessment/Plan  Diagnoses and all orders for this visit:  Inguinal hernia without obstruction or gangrene, recurrence not specified, unspecified laterality  -     Referral to General Surgery  -     Case Request Operating Room: Repair Inguinal Hernia Robot-Assisted; Standing  -     Request for Pre-Admission Testing Visit; Future  Other orders  -     Place in outpatient/hospital ambulatory surgery; Standing  -     Full code; Standing  -     NPO Diet Except: Sips with meds; Effective now; Standing  -     Height and weight; Standing  -     Insert and maintain peripheral IV; Standing  -     Saline lock IV; Standing  -     Type And Screen; Standing  -     Inpatient consult to Respiratory Care; Standing  -     " Full code; Standing  -     NPO Diet Except: Sips with meds; Effective now; Standing  -     Height and weight; Standing  -     Insert and maintain peripheral IV; Standing  -     Saline lock IV; Standing    Impression; Very large right inguinal hernia that extends into the scrotum.  I have recommended robotic right inguinal hernia repair with mesh.  We discussed the procedure to include risk benefits and alternatives.  He agrees to the operation which is tentatively scheduled for early next year

## 2024-12-16 PROBLEM — E89.0 HYPOTHYROIDISM, POSTRADIOIODINE THERAPY: Status: ACTIVE | Noted: 2024-12-16

## 2024-12-16 PROBLEM — R22.0 SWOLLEN LIP: Status: ACTIVE | Noted: 2024-12-16

## 2024-12-16 PROBLEM — M67.911 ROTATOR CUFF DISORDER, RIGHT: Status: ACTIVE | Noted: 2024-12-16

## 2024-12-16 PROBLEM — S99.912A LEFT ANKLE INJURY: Status: ACTIVE | Noted: 2024-12-16

## 2024-12-16 PROBLEM — M75.100 TEAR OF ROTATOR CUFF: Status: ACTIVE | Noted: 2024-12-16

## 2024-12-16 RX ORDER — EPINEPHRINE 0.3 MG/.3ML
INJECTION SUBCUTANEOUS
COMMUNITY
Start: 2022-08-03

## 2024-12-16 RX ORDER — CHOLECALCIFEROL (VITAMIN D3) 50 MCG
1 TABLET ORAL DAILY
COMMUNITY

## 2024-12-16 RX ORDER — GUAIFENESIN 1200 MG
TABLET, EXTENDED RELEASE 12 HR ORAL
COMMUNITY

## 2024-12-16 RX ORDER — ASPIRIN 81 MG/1
1 TABLET ORAL DAILY
COMMUNITY
Start: 2017-09-22

## 2024-12-16 RX ORDER — SILDENAFIL 100 MG/1
TABLET, FILM COATED ORAL
COMMUNITY
Start: 2019-03-13

## 2024-12-16 RX ORDER — PREDNISONE 10 MG/1
TABLET ORAL
COMMUNITY
Start: 2022-08-02

## 2024-12-16 RX ORDER — ACETAMINOPHEN 500 MG
TABLET ORAL
COMMUNITY
Start: 2014-12-09

## 2024-12-16 RX ORDER — FAMOTIDINE 20 MG/1
1 TABLET, FILM COATED ORAL 2 TIMES DAILY
COMMUNITY
Start: 2022-08-03

## 2024-12-16 RX ORDER — CETIRIZINE HYDROCHLORIDE, PSEUDOEPHEDRINE HYDROCHLORIDE 5; 120 MG/1; MG/1
TABLET, FILM COATED, EXTENDED RELEASE ORAL
COMMUNITY

## 2024-12-16 RX ORDER — CETIRIZINE HYDROCHLORIDE 10 MG/1
1 TABLET ORAL DAILY
COMMUNITY

## 2024-12-17 PROBLEM — K40.90 INGUINAL HERNIA WITHOUT OBSTRUCTION OR GANGRENE: Status: ACTIVE | Noted: 2024-12-12

## 2025-01-27 ENCOUNTER — APPOINTMENT (OUTPATIENT)
Dept: RADIOLOGY | Facility: CLINIC | Age: 75
End: 2025-01-27
Payer: MEDICARE

## 2025-01-30 ENCOUNTER — APPOINTMENT (OUTPATIENT)
Dept: PRIMARY CARE | Facility: CLINIC | Age: 75
End: 2025-01-30
Payer: MEDICARE

## 2025-01-30 ENCOUNTER — HOSPITAL ENCOUNTER (OUTPATIENT)
Dept: RADIOLOGY | Facility: CLINIC | Age: 75
Discharge: HOME | End: 2025-01-30
Payer: MEDICARE

## 2025-01-30 DIAGNOSIS — E89.0 POSTABLATIVE HYPOTHYROIDISM: ICD-10-CM

## 2025-01-30 PROCEDURE — 76536 US EXAM OF HEAD AND NECK: CPT

## 2025-01-30 NOTE — PROGRESS NOTES
"follow up for hypothyroidism. LV with me 06/2024.     History of Present Illness   74 y.o. 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     Subclinical hyperthyroidism.  11/15/2022  -NM uptake scan: hot nodule in right lobe  -DIAZ (25 mCi)    Current LT4 regimen: levothyroxine    Today  -doing well  -still playing pickle ball regularly (goes to indoor courts during the winter)    ROS  General: no fever or chills  CV: no chest pain   Respiratory: no shortness of breath  MSK: no lower extremity edema  Neuro: no headache or dizziness  See HPI for Endocrine ROS    Physical Exam   height is 1.778 m (5' 10\") and weight is 108 kg (237 lb). His blood pressure is 174/92 (abnormal) and his pulse is 108.   General: not in acute distress  HEENT: ARLENE RIZZO  Thyroid: no goiter, no palpable nodules  Neuro: alert and oriented x 3    Current Outpatient Medications   Medication Sig Dispense Refill    acetaminophen (TylenoL) 325 mg capsule Take by mouth.      aspirin 81 mg EC tablet Take 1 tablet (81 mg) by mouth once daily.      atorvastatin (Lipitor) 20 mg tablet Take 1 tablet (20 mg) by mouth once daily. 90 tablet 3    calcium citrate-vitamin D2 250 mg-2.5 mcg (100 unit) tablet Take 2,000 mg by mouth.      calcium phos,dibas-vitamin D3 100 mg calcium- 3 mcg tablet Take by mouth.      cetirizine (ZyrTEC) 10 mg tablet Take 1 tablet (10 mg) by mouth once daily.      cetirizine-pseudoephedrine (ZyrTEC-D) 5-120 mg 12 hr tablet Take by mouth.      cholecalciferol (Vitamin D-3) 50 mcg (2,000 unit) capsule Take by mouth.      cholecalciferol (Vitamin D-3) 50 MCG (2000 UT) tablet Take 1 tablet (2,000 Units) by mouth once daily.      diclofenac sodium 1 % kit Apply topically twice a day.      EPINEPHrine 0.3 mg/0.3 mL injection syringe INJECT (1 PEN) 0.3 MILLIGRAM(S) INTRAMUSCULARLY ONCE A DAY AS DIRECTED      famotidine (Pepcid) 20 mg tablet Take 1 tablet (20 mg) by mouth 2 times a day.      " fluticasone (Flonase) 50 mcg/actuation nasal spray Administer 1 spray into each nostril once daily. Shake gently. Before first use, prime pump. After use, clean tip and replace cap. 16 g 11    HYDROCHLOROTHIAZIDE ORAL Take by mouth.      IBUPROFEN ORAL Take by mouth.      levothyroxine (Synthroid, Levoxyl) 75 mcg tablet Take 1 tab, once a day (first thing in the morning on an empty stomach with water. Wait 30 mins before eating/drinking/taking other meds. Wait 4 hours before taking calcium/iron/multivitamins) 90 tablet 3    predniSONE 10 mg tablets,dose pack Take by mouth.      sildenafil (Viagra) 100 mg tablet Take by mouth.       No current facility-administered medications for this visit.     Assessment and Plan  74 y.o. male with hx of subclinical hyperthyroidism 2/2 toxic adenoma s/p DIAZ (11/15/2022) and post-ablative hypothyroidism     1. Post-ablative hypothyroidism  2. Hx of toxic adenoma   TSH: intermittently low since 201, most recently 0.05 in May 2022  11/15/2022  -NM uptake scan: large focus of increased uptake in right lobe corresponding to large right lobe nodule on US  -DIAZ (25 mCi)     Current regimen: levothyroxine 75 mcg/day (since 08/2024)    Thyroid US (10/2022)  Right lobe:   -interpolar; 3.5 x 2.5 x 2.8 cm, hypoechoic mostly solid (hot nodule)  -interpolar; 0.8 x 0.7 x 0.8 cm, hypoechoic, solid  Left lobe: cysts  Thyroid US (02/2024):  Right:  -interpolar#1; 2.3 x 2.1 x 2.0 cm, hypoechoic, solid (hot nodule)  -interpolar#2; 0.8 x 0.8 x 0.8 cm, hypoechoic, solid  Left: no nodules    Thyroid US (01/2025):  Right:  -interpolar#1; 2.0 x 1.9 x 1.8 cm, hypoechoic, solid (hot nodule)  -interpolar#2; not seen  Left: no nodules    Labs from 08/2024  TSH 4.21  *increased LT4 from 50 to 75 mcg/day    Labs from 11/2024  TSH 2.79    Euthyroid on November on current dose of levothyroxine.  He has had a very slow decline in thyroid function after DIAZ requiring small adjustments in levothyroxine until  08/2024.    Will check TSH in May. If he remains euthyroid, 75 mcg/day will likely be his maintenance dose.    Slightly smaller right thyroid nodule.    PLAN:  -check TSH in May  -continue levothyroxine 75 mcg/day     Follow-up in 1 year  Uses [employer].

## 2025-02-06 ENCOUNTER — APPOINTMENT (OUTPATIENT)
Dept: ENDOCRINOLOGY | Facility: CLINIC | Age: 75
End: 2025-02-06
Payer: MEDICARE

## 2025-02-06 VITALS
SYSTOLIC BLOOD PRESSURE: 174 MMHG | HEIGHT: 70 IN | WEIGHT: 237 LBS | DIASTOLIC BLOOD PRESSURE: 92 MMHG | BODY MASS INDEX: 33.93 KG/M2 | HEART RATE: 108 BPM

## 2025-02-06 DIAGNOSIS — E89.0 POSTABLATIVE HYPOTHYROIDISM: Primary | ICD-10-CM

## 2025-02-06 PROCEDURE — 3077F SYST BP >= 140 MM HG: CPT | Performed by: STUDENT IN AN ORGANIZED HEALTH CARE EDUCATION/TRAINING PROGRAM

## 2025-02-06 PROCEDURE — 99214 OFFICE O/P EST MOD 30 MIN: CPT | Performed by: STUDENT IN AN ORGANIZED HEALTH CARE EDUCATION/TRAINING PROGRAM

## 2025-02-06 PROCEDURE — 1159F MED LIST DOCD IN RCRD: CPT | Performed by: STUDENT IN AN ORGANIZED HEALTH CARE EDUCATION/TRAINING PROGRAM

## 2025-02-06 PROCEDURE — G2211 COMPLEX E/M VISIT ADD ON: HCPCS | Performed by: STUDENT IN AN ORGANIZED HEALTH CARE EDUCATION/TRAINING PROGRAM

## 2025-02-06 PROCEDURE — 3080F DIAST BP >= 90 MM HG: CPT | Performed by: STUDENT IN AN ORGANIZED HEALTH CARE EDUCATION/TRAINING PROGRAM

## 2025-02-06 PROCEDURE — 3008F BODY MASS INDEX DOCD: CPT | Performed by: STUDENT IN AN ORGANIZED HEALTH CARE EDUCATION/TRAINING PROGRAM

## 2025-02-06 ASSESSMENT — ENCOUNTER SYMPTOMS
LOSS OF SENSATION IN FEET: 0
DEPRESSION: 0
OCCASIONAL FEELINGS OF UNSTEADINESS: 0

## 2025-02-06 ASSESSMENT — PATIENT HEALTH QUESTIONNAIRE - PHQ9
1. LITTLE INTEREST OR PLEASURE IN DOING THINGS: NOT AT ALL
SUM OF ALL RESPONSES TO PHQ9 QUESTIONS 1 AND 2: 0
2. FEELING DOWN, DEPRESSED OR HOPELESS: NOT AT ALL

## 2025-03-14 ENCOUNTER — TELEPHONE (OUTPATIENT)
Dept: PRIMARY CARE | Facility: CLINIC | Age: 75
End: 2025-03-14

## 2025-03-14 ENCOUNTER — CLINICAL SUPPORT (OUTPATIENT)
Dept: PREADMISSION TESTING | Facility: HOSPITAL | Age: 75
End: 2025-03-14
Payer: MEDICARE

## 2025-03-14 DIAGNOSIS — K40.90 INGUINAL HERNIA WITHOUT OBSTRUCTION OR GANGRENE, RECURRENCE NOT SPECIFIED, UNSPECIFIED LATERALITY: ICD-10-CM

## 2025-03-14 NOTE — TELEPHONE ENCOUNTER
Spouse would like to know if we can move his appointment earlier than April 23rd, because she says that he has really bad teeth and needs to have the oral surgery hopefully earlier than later

## 2025-03-14 NOTE — CPM/PAT NURSE NOTE
CPM/PAT Nurse Note      Name: Wil Dinero (Wil Rodriguezrbrough)  /Age: 1950/74 y.o.       Past Medical History:   Diagnosis Date    AAA (abdominal aortic aneurysm) (CMS-HCC)     noted in chart - ECHO 2017 - no aneurysm saw Dr. Wilcox 2018 - f/u prn    DVT (deep venous thrombosis) (Multi)     pt states only had DVT, no PE.  No documentation of DVT noted    Elevated AST (SGOT)     11.16.24: AST 43    Elevated BP without diagnosis of hypertension     home BPs WNL per pt    Elevated PSA     Gout     Hyperlipidemia     Hypothyroid     s/p DIAZ    Inguinal hernia     Inguinal hernia     Positive colorectal cancer screening using Cologuard test     years ago, followed by colonoscopy    Pulmonary embolism     2016 - bilateral PE - unsure if provoked - xarelto x 3 months, now on ASA 81mg    Subclinical hyperthyroidism     Syncope     1-2 episodes >10 yrs ago, no recent issues    Tubular adenoma     with high grade dysplasia on colonoscopy 2017    Vitamin D deficiency        Past Surgical History:   Procedure Laterality Date    COLONOSCOPY  2017    Complete Colonoscopy    ROTATOR CUFF REPAIR Right        Patient Sexual activity questions deferred to the physician.    Family History   Problem Relation Name Age of Onset    Hypertension Mother      Colon cancer Father      No Known Problems Brother         No Known Allergies    Prior to Admission medications    Medication Sig Start Date End Date Taking? Authorizing Provider   acetaminophen (TylenoL) 325 mg capsule Take by mouth.    Historical Provider, MD   aspirin 81 mg EC tablet Take 1 tablet (81 mg) by mouth once daily. 17   Historical Provider, MD   atorvastatin (Lipitor) 20 mg tablet Take 1 tablet (20 mg) by mouth once daily. 24   Fletcher Estrella MD   cetirizine-pseudoephedrine (ZyrTEC-D) 5-120 mg 12 hr tablet Take 1 tablet by mouth if needed.    Historical Provider, MD   cholecalciferol (Vitamin D-3) 50 mcg (2,000 unit) capsule Take by  mouth. 12/9/14   Historical Provider, MD   fluticasone (Flonase) 50 mcg/actuation nasal spray Administer 1 spray into each nostril once daily. Shake gently. Before first use, prime pump. After use, clean tip and replace cap. 11/19/24 11/19/25  Fletcher Estrella MD   levothyroxine (Synthroid, Levoxyl) 75 mcg tablet Take 1 tab, once a day (first thing in the morning on an empty stomach with water. Wait 30 mins before eating/drinking/taking other meds. Wait 4 hours before taking calcium/iron/multivitamins) 9/3/24   Valentina Avilez MD   calcium citrate-vitamin D2 250 mg-2.5 mcg (100 unit) tablet Take 2,000 mg by mouth.  3/14/25  Historical Provider, MD   calcium phos,dibas-vitamin D3 100 mg calcium- 3 mcg tablet Take by mouth.  3/14/25  Historical Provider, MD   cetirizine (ZyrTEC) 10 mg tablet Take 1 tablet (10 mg) by mouth once daily.  3/14/25  Historical Provider, MD   cholecalciferol (Vitamin D-3) 50 MCG (2000 UT) tablet Take 1 tablet (2,000 Units) by mouth once daily.  3/14/25  Historical Provider, MD   diclofenac sodium 1 % kit Apply topically twice a day. 12/15/22 3/14/25  Historical Provider, MD   EPINEPHrine 0.3 mg/0.3 mL injection syringe INJECT (1 PEN) 0.3 MILLIGRAM(S) INTRAMUSCULARLY ONCE A DAY AS DIRECTED 8/3/22 3/14/25  Historical Provider, MD   famotidine (Pepcid) 20 mg tablet Take 1 tablet (20 mg) by mouth 2 times a day. 8/3/22 3/14/25  Historical Provider, MD   HYDROCHLOROTHIAZIDE ORAL Take by mouth.  3/14/25  Historical Provider, MD   IBUPROFEN ORAL Take by mouth.  3/14/25  Historical Provider, MD   predniSONE 10 mg tablets,dose pack Take by mouth. 8/2/22 3/14/25  Historical Provider, MD   sildenafil (Viagra) 100 mg tablet Take by mouth. 3/13/19 3/14/25  Historical Provider, MD        PAT ROS     DASI Risk Score    No data to display       Caprini DVT Assessment    No data to display       Modified Frailty Index    No data to display       [id number] Stroke Risk Points  Current as of just now         N/A 0 to 9 Points:      Last Change: N/A          The ZQP1EX2-PAFk risk score (Malgorzata MIN, et al. 2009. © 2010 [employer]) quantifies the risk of stroke for a patient with atrial fibrillation. For patients without atrial fibrillation or under the age of 18 this score appears as N/A. Higher score values generally indicate higher risk of stroke.        This score is not applicable to this patient. Components are not calculated.          Revised Cardiac Risk Index    No data to display       Apfel Simplified Score    No data to display       Risk Analysis Index Results This Encounter    No data found in the last 10 encounters.       Prodigy: High Risk  Total Score: 20              Prodigy Age Score      Prodigy Gender Score          ARISCAT Score for Postoperative Pulmonary Complications    No data to display       Mayes Perioperative Risk for Myocardial Infarction or Cardiac Arrest (MARILEE)    No data to display         Nurse Plan of Action:     RN screening call complete.  Reviewed allergies, medications and pharmacy, medical, surgical and social history with patient.  Chart updated.

## 2025-03-16 LAB
ALBUMIN/CREAT UR: NORMAL
APPEARANCE UR: CLEAR
BACTERIA #/AREA URNS HPF: ABNORMAL /HPF
BASOPHILS # BLD AUTO: 9 CELLS/UL (ref 0–200)
BASOPHILS NFR BLD AUTO: 0.3 %
BILIRUB UR QL STRIP: NEGATIVE
CHOLEST SERPL-MCNC: 189 MG/DL
CHOLEST/HDLC SERPL: 1.9 (CALC)
COLOR UR: YELLOW
CREAT UR-MCNC: NORMAL MG/DL
EOSINOPHIL # BLD AUTO: 31 CELLS/UL (ref 15–500)
EOSINOPHIL NFR BLD AUTO: 1 %
ERYTHROCYTE [DISTWIDTH] IN BLOOD BY AUTOMATED COUNT: 13.6 % (ref 11–15)
GLUCOSE UR QL STRIP: NEGATIVE
HCT VFR BLD AUTO: 40.9 % (ref 38.5–50)
HDLC SERPL-MCNC: 102 MG/DL
HGB BLD-MCNC: 13.6 G/DL (ref 13.2–17.1)
HGB UR QL STRIP: NEGATIVE
HYALINE CASTS #/AREA URNS LPF: ABNORMAL /LPF
KETONES UR QL STRIP: NEGATIVE
LDLC SERPL CALC-MCNC: 73 MG/DL (CALC)
LEUKOCYTE ESTERASE UR QL STRIP: NEGATIVE
LYMPHOCYTES # BLD AUTO: 1395 CELLS/UL (ref 850–3900)
LYMPHOCYTES NFR BLD AUTO: 45 %
MCH RBC QN AUTO: 34 PG (ref 27–33)
MCHC RBC AUTO-ENTMCNC: 33.3 G/DL (ref 32–36)
MCV RBC AUTO: 102.3 FL (ref 80–100)
MICROALBUMIN UR-MCNC: NORMAL
MONOCYTES # BLD AUTO: 304 CELLS/UL (ref 200–950)
MONOCYTES NFR BLD AUTO: 9.8 %
NEUTROPHILS # BLD AUTO: 1361 CELLS/UL (ref 1500–7800)
NEUTROPHILS NFR BLD AUTO: 43.9 %
NITRITE UR QL STRIP: NEGATIVE
NONHDLC SERPL-MCNC: 87 MG/DL (CALC)
PH UR STRIP: 6.5 [PH] (ref 5–8)
PLATELET # BLD AUTO: 183 THOUSAND/UL (ref 140–400)
PMV BLD REES-ECKER: 10.3 FL (ref 7.5–12.5)
PROT UR QL STRIP: ABNORMAL
RBC # BLD AUTO: 4 MILLION/UL (ref 4.2–5.8)
RBC #/AREA URNS HPF: ABNORMAL /HPF
SERVICE CMNT-IMP: ABNORMAL
SP GR UR STRIP: 1.02 (ref 1–1.03)
SQUAMOUS #/AREA URNS HPF: ABNORMAL /HPF
TRIGL SERPL-MCNC: 49 MG/DL
WBC # BLD AUTO: 3.1 THOUSAND/UL (ref 3.8–10.8)
WBC #/AREA URNS HPF: ABNORMAL /HPF

## 2025-03-17 LAB
ALBUMIN/CREAT UR: 14 MG/G CREAT
APPEARANCE UR: CLEAR
BACTERIA #/AREA URNS HPF: ABNORMAL /HPF
BASOPHILS # BLD AUTO: 9 CELLS/UL (ref 0–200)
BASOPHILS NFR BLD AUTO: 0.3 %
BILIRUB UR QL STRIP: NEGATIVE
CHOLEST SERPL-MCNC: 189 MG/DL
CHOLEST/HDLC SERPL: 1.9 (CALC)
COLOR UR: YELLOW
CREAT UR-MCNC: 164 MG/DL (ref 20–320)
EOSINOPHIL # BLD AUTO: 31 CELLS/UL (ref 15–500)
EOSINOPHIL NFR BLD AUTO: 1 %
ERYTHROCYTE [DISTWIDTH] IN BLOOD BY AUTOMATED COUNT: 13.6 % (ref 11–15)
FOLATE SERPL-MCNC: 9.8 NG/ML
GLUCOSE UR QL STRIP: NEGATIVE
HCT VFR BLD AUTO: 40.9 % (ref 38.5–50)
HDLC SERPL-MCNC: 102 MG/DL
HGB BLD-MCNC: 13.6 G/DL (ref 13.2–17.1)
HGB UR QL STRIP: NEGATIVE
HYALINE CASTS #/AREA URNS LPF: ABNORMAL /LPF
KETONES UR QL STRIP: NEGATIVE
LDLC SERPL CALC-MCNC: 73 MG/DL (CALC)
LEUKOCYTE ESTERASE UR QL STRIP: NEGATIVE
LYMPHOCYTES # BLD AUTO: 1395 CELLS/UL (ref 850–3900)
LYMPHOCYTES NFR BLD AUTO: 45 %
MCH RBC QN AUTO: 34 PG (ref 27–33)
MCHC RBC AUTO-ENTMCNC: 33.3 G/DL (ref 32–36)
MCV RBC AUTO: 102.3 FL (ref 80–100)
MICROALBUMIN UR-MCNC: 2.3 MG/DL
MONOCYTES # BLD AUTO: 304 CELLS/UL (ref 200–950)
MONOCYTES NFR BLD AUTO: 9.8 %
NEUTROPHILS # BLD AUTO: 1361 CELLS/UL (ref 1500–7800)
NEUTROPHILS NFR BLD AUTO: 43.9 %
NITRITE UR QL STRIP: NEGATIVE
NONHDLC SERPL-MCNC: 87 MG/DL (CALC)
PH UR STRIP: 6.5 [PH] (ref 5–8)
PLATELET # BLD AUTO: 183 THOUSAND/UL (ref 140–400)
PMV BLD REES-ECKER: 10.3 FL (ref 7.5–12.5)
PROT UR QL STRIP: ABNORMAL
PSA SERPL-MCNC: 6.79 NG/ML
RBC # BLD AUTO: 4 MILLION/UL (ref 4.2–5.8)
RBC #/AREA URNS HPF: ABNORMAL /HPF
SERVICE CMNT-IMP: ABNORMAL
SP GR UR STRIP: 1.02 (ref 1–1.03)
SQUAMOUS #/AREA URNS HPF: ABNORMAL /HPF
TRIGL SERPL-MCNC: 49 MG/DL
TSH SERPL-ACNC: 1.92 MIU/L (ref 0.4–4.5)
VIT B12 SERPL-MCNC: 461 PG/ML (ref 200–1100)
WBC # BLD AUTO: 3.1 THOUSAND/UL (ref 3.8–10.8)
WBC #/AREA URNS HPF: ABNORMAL /HPF

## 2025-03-21 ENCOUNTER — PRE-ADMISSION TESTING (OUTPATIENT)
Dept: PREADMISSION TESTING | Facility: HOSPITAL | Age: 75
End: 2025-03-21
Payer: MEDICARE

## 2025-03-21 ENCOUNTER — LAB (OUTPATIENT)
Dept: LAB | Facility: HOSPITAL | Age: 75
End: 2025-03-21
Payer: MEDICARE

## 2025-03-21 ENCOUNTER — DOCUMENTATION (OUTPATIENT)
Dept: PREADMISSION TESTING | Facility: HOSPITAL | Age: 75
End: 2025-03-21

## 2025-03-21 VITALS
HEIGHT: 69 IN | SYSTOLIC BLOOD PRESSURE: 146 MMHG | DIASTOLIC BLOOD PRESSURE: 66 MMHG | TEMPERATURE: 98.6 F | OXYGEN SATURATION: 98 % | RESPIRATION RATE: 18 BRPM | WEIGHT: 227.07 LBS | BODY MASS INDEX: 33.63 KG/M2 | HEART RATE: 97 BPM

## 2025-03-21 DIAGNOSIS — Z01.818 ENCOUNTER FOR OTHER PREPROCEDURAL EXAMINATION: Primary | ICD-10-CM

## 2025-03-21 DIAGNOSIS — Z01.818 PREOP TESTING: Primary | ICD-10-CM

## 2025-03-21 LAB
ANION GAP SERPL CALC-SCNC: 13 MMOL/L (ref 10–20)
ATRIAL RATE: 98 BPM
BASOPHILS # BLD AUTO: 0.02 X10*3/UL (ref 0–0.1)
BASOPHILS NFR BLD AUTO: 0.7 %
BUN SERPL-MCNC: 11 MG/DL (ref 6–23)
CALCIUM SERPL-MCNC: 9.1 MG/DL (ref 8.6–10.3)
CHLORIDE SERPL-SCNC: 103 MMOL/L (ref 98–107)
CO2 SERPL-SCNC: 28 MMOL/L (ref 21–32)
CREAT SERPL-MCNC: 0.88 MG/DL (ref 0.5–1.3)
EGFRCR SERPLBLD CKD-EPI 2021: >90 ML/MIN/1.73M*2
EOSINOPHIL # BLD AUTO: 0.01 X10*3/UL (ref 0–0.4)
EOSINOPHIL NFR BLD AUTO: 0.3 %
ERYTHROCYTE [DISTWIDTH] IN BLOOD BY AUTOMATED COUNT: 13.7 % (ref 11.5–14.5)
GLUCOSE SERPL-MCNC: 90 MG/DL (ref 74–99)
HCT VFR BLD AUTO: 38.8 % (ref 41–52)
HGB BLD-MCNC: 13.1 G/DL (ref 13.5–17.5)
IMM GRANULOCYTES # BLD AUTO: 0.02 X10*3/UL (ref 0–0.5)
IMM GRANULOCYTES NFR BLD AUTO: 0.7 % (ref 0–0.9)
LYMPHOCYTES # BLD AUTO: 1.15 X10*3/UL (ref 0.8–3)
LYMPHOCYTES NFR BLD AUTO: 38.9 %
MCH RBC QN AUTO: 33.7 PG (ref 26–34)
MCHC RBC AUTO-ENTMCNC: 33.8 G/DL (ref 32–36)
MCV RBC AUTO: 100 FL (ref 80–100)
MONOCYTES # BLD AUTO: 0.3 X10*3/UL (ref 0.05–0.8)
MONOCYTES NFR BLD AUTO: 10.1 %
NEUTROPHILS # BLD AUTO: 1.46 X10*3/UL (ref 1.6–5.5)
NEUTROPHILS NFR BLD AUTO: 49.3 %
NRBC BLD-RTO: 0 /100 WBCS (ref 0–0)
P AXIS: 54 DEGREES
P OFFSET: 152 MS
P ONSET: 97 MS
PLATELET # BLD AUTO: 188 X10*3/UL (ref 150–450)
POTASSIUM SERPL-SCNC: 3.6 MMOL/L (ref 3.5–5.3)
PR INTERVAL: 226 MS
Q ONSET: 210 MS
QRS COUNT: 16 BEATS
QRS DURATION: 94 MS
QT INTERVAL: 368 MS
QTC CALCULATION(BAZETT): 469 MS
QTC FREDERICIA: 433 MS
R AXIS: 0 DEGREES
RBC # BLD AUTO: 3.89 X10*6/UL (ref 4.5–5.9)
SODIUM SERPL-SCNC: 140 MMOL/L (ref 136–145)
T AXIS: 47 DEGREES
T OFFSET: 394 MS
VENTRICULAR RATE: 98 BPM
WBC # BLD AUTO: 3 X10*3/UL (ref 4.4–11.3)

## 2025-03-21 PROCEDURE — 85025 COMPLETE CBC W/AUTO DIFF WBC: CPT

## 2025-03-21 PROCEDURE — 93005 ELECTROCARDIOGRAM TRACING: CPT | Performed by: PHYSICIAN ASSISTANT

## 2025-03-21 PROCEDURE — 87081 CULTURE SCREEN ONLY: CPT | Mod: AHULAB | Performed by: PHYSICIAN ASSISTANT

## 2025-03-21 PROCEDURE — 99204 OFFICE O/P NEW MOD 45 MIN: CPT | Performed by: PHYSICIAN ASSISTANT

## 2025-03-21 PROCEDURE — 80048 BASIC METABOLIC PNL TOTAL CA: CPT

## 2025-03-21 PROCEDURE — 93010 ELECTROCARDIOGRAM REPORT: CPT | Performed by: INTERNAL MEDICINE

## 2025-03-21 RX ORDER — CHLORHEXIDINE GLUCONATE ORAL RINSE 1.2 MG/ML
SOLUTION DENTAL
Qty: 475 ML | Refills: 0 | Status: SHIPPED | OUTPATIENT
Start: 2025-03-21

## 2025-03-21 ASSESSMENT — ENCOUNTER SYMPTOMS
DYSPNEA WITH EXERTION: 0
SHORTNESS OF BREATH: 0
VOMITING: 0
ABDOMINAL PAIN: 0
LIGHT-HEADEDNESS: 0
FEVER: 0
CONSTIPATION: 0
NECK PAIN: 0
NUMBNESS: 0
SKIN CHANGES: 0
COUGH: 0
PALPITATIONS: 0
NAUSEA: 0
WHEEZING: 0
CONFUSION: 0
DYSPNEA AT REST: 0
ABDOMINAL DISTENTION: 0
NECK STIFFNESS: 0
UNEXPECTED WEIGHT CHANGE: 0
DYSURIA: 0
RHINORRHEA: 0
EYE PAIN: 0
EYE DISCHARGE: 0
CHILLS: 0
EXCESSIVE BLEEDING: 0
BRUISES/BLEEDS EASILY: 0
DIARRHEA: 0
SINUS CONGESTION: 0
DOUBLE VISION: 0
DIFFICULTY URINATING: 0
WOUND: 0
TROUBLE SWALLOWING: 0
HEMOPTYSIS: 0
BLOOD IN STOOL: 0
VISUAL CHANGE: 0
WEAKNESS: 0
LIMITED RANGE OF MOTION: 0
ARTHRALGIAS: 0
MYALGIAS: 0

## 2025-03-21 NOTE — H&P (VIEW-ONLY)
[facility]/PAT Evaluation       Name: Wil Dinero (Wil Dinero)  /Age: 1950/74 y.o.       Date of Consult: 3/21/25    Referring Provider: Dr. Bates    Surgery, Date, and Length:     Robot Assisted Right Inguinal Hernia Repair; Mesh Placement - Right   4/3/25, 150MIN    Wil Dinero is a 74 y.o. year-old male who presents to the [facility] for perioperative risk assessment prior to surgery.    Patient presents with a primary diagnosis of right inguinal hernia. Pt first noted scrotal swelling about 12 months ago.  No pain associated.  US scrotum revealed incarcerated loop of bowel.  No changes to bowel habits. No f/c/n/v.     This note was created in part upon personal review of patient's medical records.      Patient is scheduled to have Robot Assisted Right Inguinal Hernia Repair; Mesh Placement - Right      Pt denies any past history of anesthetic complications such as PONV, awareness, prolonged sedation, dental damage, aspiration, cardiac arrest, difficult intubation, difficult I.V. access or unexpected hospital admissions.  NO malignant hyperthermia and or pseudocholinesterase deficiency.  No history of blood transfusions     The patient is not a Voodoo and will accept blood and blood products if medically indicated.   Type and screen sent.     Past Medical History:   Diagnosis Date    AAA (abdominal aortic aneurysm) (CMS-HCC)     noted in chart - ECHO 2017 - no aneurysm saw Dr. Wilcox 2018 - f/u prn    DVT (deep venous thrombosis) (Multi)     pt states only had DVT, no PE.  No documentation of DVT noted    Elevated AST (SGOT)     11.16.24: AST 43    Elevated BP without diagnosis of hypertension     home BPs WNL per pt    Elevated PSA     Gout     Hyperlipidemia     Hypothyroid     s/p DIAZ    Inguinal hernia     Inguinal hernia     Positive colorectal cancer screening using Cologuard test     years ago, followed by colonoscopy    Pulmonary embolism     2016 - bilateral PE - unsure  if provoked - xarelto x 3 months, now on ASA 81mg    Subclinical hyperthyroidism     Syncope     1-2 episodes >10 yrs ago, no recent issues    Tubular adenoma     with high grade dysplasia on colonoscopy 2017    Vitamin D deficiency        Past Surgical History:   Procedure Laterality Date    COLONOSCOPY  03/22/2017    Complete Colonoscopy    ROTATOR CUFF REPAIR Right 2022       Patient Sexual activity questions deferred to the physician.    Family History   Problem Relation Name Age of Onset    Hypertension Mother      Colon cancer Father      No Known Problems Brother       Social History     Socioeconomic History    Marital status:      Spouse name: Not on file    Number of children: 3    Years of education: Not on file    Highest education level: Not on file   Occupational History    Not on file   Tobacco Use    Smoking status: Never    Smokeless tobacco: Never   Vaping Use    Vaping status: Never Used   Substance and Sexual Activity    Alcohol use: Yes     Alcohol/week: 2.0 - 3.0 standard drinks of alcohol     Types: 2 - 3 Standard drinks or equivalent per week    Drug use: Never    Sexual activity: Defer   Other Topics Concern    Not on file   Social History Narrative    Not on file     Social Drivers of Health     Financial Resource Strain: Not on file   Food Insecurity: Not on file   Transportation Needs: Not on file   Physical Activity: Sufficiently Active (11/19/2024)    Exercise Vital Sign     Days of Exercise per Week: 3 days     Minutes of Exercise per Session: 60 min   Stress: Not on file   Social Connections: Not on file   Intimate Partner Violence: Not on file   Housing Stability: Not on file        No Known Allergies    Current Outpatient Medications   Medication Instructions    acetaminophen (TylenoL) 325 mg capsule Take by mouth.    aspirin 81 mg EC tablet 1 tablet, Daily    atorvastatin (LIPITOR) 20 mg, oral, Daily    cetirizine-pseudoephedrine (ZyrTEC-D) 5-120 mg 12 hr tablet 1 tablet, As  needed    chlorhexidine (Peridex) 0.12 % solution Swish for 30 seconds and spit 15mL of solution the night before and morning of surgery    cholecalciferol (Vitamin D-3) 50 mcg (2,000 unit) capsule Take by mouth.    fluticasone (Flonase) 50 mcg/actuation nasal spray 1 spray, Each Nostril, Daily, Shake gently. Before first use, prime pump. After use, clean tip and replace cap.    levothyroxine (Synthroid, Levoxyl) 75 mcg tablet Take 1 tab, once a day (first thing in the morning on an empty stomach with water. Wait 30 mins before eating/drinking/taking other meds. Wait 4 hours before taking calcium/iron/multivitamins)           PAT ROS:   Constitutional:    no fever   no chills   no unexpected weight change  Neuro/Psych:    no numbness   no weakness   no light-headedness   no confusion  Eyes:    no discharge   no pain   no vision loss   no diplopia   no visual disturbance   use of corrective lenses  Ears:    no ear pain   no hearing loss   no tinnitus  Nose:    no nasal discharge   no sinus congestion   no epistaxis  Mouth:    no dental issues   no mouth pain   no oral bleeding   no mouth lesions  Throat:    no throat pain   no dysphagia  Neck:    no neck pain   no neck stiffness  Cardio:    Functional 4 Mets. Patient denies SOB walking up 2 flights of stairs   [place];  cooking, cleaning, grocery shopping, caring for 90yo mother   no chest pain   no palpitations   no peripheral edema   no dyspnea   no WESTON  Respiratory:    no cough   no wheezing   no hemoptysis   no shortness of breath  Endocrine:    no cold intolerance   no heat intolerance  GI:    no abdominal distention   no abdominal pain   no constipation   no diarrhea   no nausea   no vomiting   no blood in stool  :    no difficulty urinating   no dysuria   no oliguria  Musculoskeletal:    no arthralgias   no myalgias   no decreased ROM  Hematologic:    does not bruise/bleed easily   no excessive bleeding   no history of blood transfusion   blood  clots  Skin:   no skin changes   no sores/wound   no rash      Physical Exam  Constitutional:       General: He is not in acute distress.     Appearance: Normal appearance. He is not ill-appearing, toxic-appearing or diaphoretic.   HENT:      Head: Normocephalic and atraumatic.      Nose: Nose normal. No congestion or rhinorrhea.      Mouth/Throat:      Mouth: Mucous membranes are moist.      Pharynx: No posterior oropharyngeal erythema.   Eyes:      Extraocular Movements: Extraocular movements intact.      Conjunctiva/sclera: Conjunctivae normal.   Cardiovascular:      Rate and Rhythm: Normal rate and regular rhythm.      Pulses: Normal pulses.      Heart sounds: Normal heart sounds. No murmur heard.     No friction rub. No gallop.   Pulmonary:      Effort: Pulmonary effort is normal. No respiratory distress.      Breath sounds: Normal breath sounds. No stridor. No wheezing, rhonchi or rales.   Abdominal:      General: Bowel sounds are normal. There is no distension.      Palpations: Abdomen is soft. There is no mass.      Tenderness: There is no abdominal tenderness. There is no guarding or rebound.      Hernia: A hernia (right inguinal) is present.   Musculoskeletal:         General: No swelling, tenderness, deformity or signs of injury. Normal range of motion.      Cervical back: Normal range of motion and neck supple. No rigidity or tenderness.   Skin:     General: Skin is warm and dry.      Coloration: Skin is not jaundiced or pale.      Findings: No bruising, erythema, lesion or rash.   Neurological:      General: No focal deficit present.      Mental Status: He is alert and oriented to person, place, and time.      Cranial Nerves: No cranial nerve deficit.      Sensory: No sensory deficit.      Motor: No weakness.      Coordination: Coordination normal.   Psychiatric:         Mood and Affect: Mood normal.         Behavior: Behavior normal.          PAT AIRWAY:   Airway:     Mallampati::  III    Neck ROM::   "Full   Few missing teeth; no loose teeth           Visit Vitals  /66 Comment: manual   Pulse 97   Temp 37 °C (98.6 °F) (Temporal)   Resp 18   Ht 1.746 m (5' 8.75\")   Wt 103 kg (227 lb 1.2 oz)   SpO2 98%   BMI 33.78 kg/m²   Smoking Status Never   BSA 2.24 m²      LABS:  Lab Results   Component Value Date    WBC 3.0 (L) 03/21/2025    HGB 13.1 (L) 03/21/2025    HCT 38.8 (L) 03/21/2025     03/21/2025     03/21/2025      Lab Results   Component Value Date    GLUCOSE 90 03/21/2025    CALCIUM 9.1 03/21/2025     03/21/2025    K 3.6 03/21/2025    CO2 28 03/21/2025     03/21/2025    BUN 11 03/21/2025    CREATININE 0.88 03/21/2025      EKG 3/21/25  Sinus rhythm with 1st degree AV block  Otherwise normal EKG  Vent rate = 98 bpm    ECHO 10/11/17  Ao Root: 3.70 cm (2.0-3.7cm)  LAs:     4.30 cm (2.7-4.0cm)  Asc Ao, s: 3.3 cm     CONCLUSIONS:   1. The left ventricular systolic function is normal with a 55-60% estimated ejection fraction.   2. Spectral Doppler shows an impaired relaxation pattern of left ventricular diastolic filling.    ECHO 12/7/16  Ao Root: 4.00 cm (2.0-3.7cm)  LAs:     3.87 cm (2.7-4.0cm)  Asc Ao, d: 3.20 cm (2.1-3.4cm)   CONCLUSIONS:   1. The left ventricular systolic function is normal with a 65-70% estimated ejection fraction.   2. Spectral Doppler shows an impaired relaxation pattern of left ventricular diastolic filling.   3. The left atrium is normal in size.   4. RVSP within normal limits.   5. There is severe pulmonic valve regurgitation.   6. The estimated PASP is 32 mmHg.   7. There is plaque visualized in the ascending aorta.       Assessment and Plan:     74 y.o.  male  scheduled for Robot Assisted Right Inguinal Hernia Repair; Mesh Placement - Right on 4/3/25 with Dr. Bates for  right inguinal hernia.   P[facility] to [facility] today for perioperative risk stratification and optimization      Cardiovascular:  Patient has no active cardiac symptoms.   Patient denies any chest " "pain, tightness, heaviness, pressure, radiating pain, palpitations, irregular heartbeats, lightheadedness, cough, congestion, shortness of breath, WESTON, PND, near syncope, weight loss or gain.    METS: 4+  RCRI: 0 points, 3.9%  risk for postoperative MACE     HLD - cont statin on dos     Elevated BP -     AAA - pt saw Dr. De Guzman 2018 \"no aneurysm\" on most recent imaging 2018; follow up prn with cards    Pulmonary:  No pulmonary diagnosis, however patient is at increased risk of perioperative complications secondary to  age > 60, obesity, duration of surgery > 2 hours  Stop Bang score is 2 placing patient at low risk for JEFFERY  ARISCAT: <26 points, 1.6% risk of in-hospital postoperative pulmonary complication  PRODIGY: High risk for opioid induced respiratory depression    **Pt provided with deep breathing exercises during PAT visit today**    Endocrine:  Hypothyroidism - cont levothyroxine on dos     Vascular:  DVT/ PE - 2016 - took anticoagulants for some time; currently off without recurrence; cont ASA 81mg on dos      Neuro:  No neurologic diagnosis, however, the patient is at increased risk for perioperative delirium secondary to  age, type and duration of surgery, Patient instructed on and provided cognitive exercises    Hematology:  Anemima 3/21/25 H/H 13.1/38.8%    Patient instructed to ambulate as soon as possible postoperatively to decrease thromboembolic risk.   Initiate mechanical DVT prophylaxis as soon as possible and initiate chemical prophylaxis when deemed safe from a bleeding standpoint post surgery.     LABS: CBC, BMP, MRSA, EKG ordered. Lab results reviewed and unremarkable.    Followup: MRSA pending    Caprini: 8    Risk assessment complete.  Patient is scheduled for a intermediate surgical risk procedure.        Preoperative medication instructions were provided and reviewed with the patient.  Any additional testing or evaluation was explained to the patient.  Nothing by mouth instructions were " discussed and patient's questions were answered prior to conclusion to this encounter.  Patient verbalized understanding of preoperative instructions given in preadmission testing; discharge instructions available in EMR.    This note was dictated by a speech recognition.  Minor errors may have been detected in a speech recognition.

## 2025-03-21 NOTE — PREPROCEDURE INSTRUCTIONS
Medication List            Accurate as of March 21, 2025  9:02 AM. Always use your most recent med list.                aspirin 81 mg EC tablet  Medication Adjustments for Surgery: Take on the morning of surgery     atorvastatin 20 mg tablet  Commonly known as: Lipitor  Take 1 tablet (20 mg) by mouth once daily.  Medication Adjustments for Surgery: Take on the morning of surgery     chlorhexidine 0.12 % solution  Commonly known as: Peridex  Swish for 30 seconds and spit 15mL of solution the night before and morning of surgery  Medication Adjustments for Surgery: Take/Use as prescribed     cholecalciferol 50 mcg (2,000 unit) capsule  Commonly known as: Vitamin D-3  Medication Adjustments for Surgery: Do Not take on the morning of surgery     fluticasone 50 mcg/actuation nasal spray  Commonly known as: Flonase  Administer 1 spray into each nostril once daily. Shake gently. Before first use, prime pump. After use, clean tip and replace cap.  Medication Adjustments for Surgery: Take/Use as prescribed     levothyroxine 75 mcg tablet  Commonly known as: Synthroid, Levoxyl  Take 1 tab, once a day (first thing in the morning on an empty stomach with water. Wait 30 mins before eating/drinking/taking other meds. Wait 4 hours before taking calcium/iron/multivitamins)  Medication Adjustments for Surgery: Take on the morning of surgery     TylenoL 325 mg capsule  Generic drug: acetaminophen  Medication Adjustments for Surgery: Take on the morning of surgery  Notes to patient: If needed     ZyrTEC-D 5-120 mg 12 hr tablet  Generic drug: cetirizine-pseudoephedrine  Medication Adjustments for Surgery: Do Not take on the morning of surgery                          **Concerning above medication instructions, if medication is normally taken at night, continue normal schedule.**  **DO NOT TAKE NIGHT PRIOR AND MORNING OF SURGERY**    CONTACT SURGEON'S OFFICE IF YOU DEVELOP:  * Fever = 100.4 F   * New respiratory symptoms (e.g. cough,  shortness of breath, respiratory distress, sore throat)  * Recent loss of taste or smell  *Flu like symptoms such as headache, fatigue or gastrointestinal symptoms  * You develop any open sores, shingles, burning or painful urination   AND/OR:  * You no longer wish to have the surgery.  * Any other personal circumstances change that may lead to the need to cancel or defer this surgery.  *You were admitted to any hospital within one week of your planned procedure.    SMOKING:  *Quitting smoking can make a huge difference to your health and recovery from surgery.    *If you need help with quitting, call 4-416-QUIT-NOW.    THE DAY OF SURGERY:  *Do not eat any food after midnight the night before surgery.   *You must drink 13.5 ounces of clear liquids (i.e. water, black coffee (no milk or cream), tea, apple juice or electrolyte drinks (gatorade)) 2 hours before your arrival time.  *You may chew gum until 2 hours before your surgery    SURGICAL TIME  *You will be contacted between 2 p.m. and 6 p.m. the business day before your surgery with your arrival time.  *If you haven't received a call by 6pm, call [phone].  *Scheduled surgery times may change and you will be notified if this occurs-check your personal voicemail for any updates.    ON THE MORNING OF SURGERY:  *Wear comfortable, loose fitting clothing.   *Do not use moisturizers, creams, lotions or perfume.  *All jewelry and valuables should be left at home.  *Prosthetic devices such as contact lenses, hearing aids, dentures, eyelash extensions, hairpins and body piercing must be removed before surgery.    BRING WITH YOU:  *Photo ID and insurance card  *Current list of medicines and allergies  *Pacemaker/Defibrillator/Heart stent cards  *CPAP machine and mask  *Slings/splints/crutches  *Copy of your complete Advanced Directive/DHPOA-if applicable  *Neurostimulator implant remote    PARKING AND ARRIVAL:  *Check in at the Main Entrance desk and let them know you  are here for surgery.  *You will be directed to the 2nd floor surgical waiting area.    AFTER OUTPATIENT SURGERY:  *A responsible adult MUST accompany you at the time of discharge and stay with you for 24 hours after your surgery.  *You may NOT drive yourself home after surgery.  *You may use a taxi or ride sharing service ([employer], [employer]) to return home ONLY if you are accompanied by a friend or family member.  *Instructions for resuming your medications will be provided by your surgeon.         Patient Information: Oral/Dental Rinse  **This is a prescription; pick it up at your preferred local pharmacy **  What is oral/dental rinse?   It is a mouthwash. It is a way of cleaning the mouth with a germ killing solution before your surgery.  The solution contains chlorhexidine, commonly known as CHG.   It is used inside the mouth to kill a bacteria known as Staphylococcus aureus.  Let your doctor know if you are allergic to Chlorhexidine.    Why do I need to use CHG oral/dental rinse?  The CHG oral/dental rinse helps to kill a bacteria in your mouth known a Staphylococcus aureus.     This reduces the risk of infection at the surgical site.      Using your CHG oral/dental rinse  STEPS:  Use your CHG oral/dental rinse after you brush your teeth the night before (at bedtime) and the morning of your surgery.  Follow all directions on your prescription label.    Use the cap on the container to measure 15ml (fill cap to fill line)  Swish (gargle if you can) the mouthwash in your mouth for at least 30 seconds, (do not to swallow) spit out  After you use your CHG rinse, do not rinse your mouth with water, drink or eat.  Please refer to prescription label for the appropriate time to resume oral intake  Dental rinse comes in one size bottle: 473ml ~16oz.  You will have leftover    rinse, discard after this use.    What side effects might I have using the CHG oral/dental rinse?  CHG rinse will stick to plaque on the teeth.  Brush and  floss just before use.  Teeth brushing will help avoid staining of plaque during use.    Who should I contact if I have questions about the CHG oral/dental rinse?  Please call [facility], Preadmission Testing at [phone] if you have any questions      Home Preoperative Antibacterial Shower     What is a home preoperative antibacterial shower?  This shower is a way of cleaning the skin with a germ killing soap before surgery.  The soap contains chlorhexidine, commonly known as CHG.  CHG is a soap for your skin with germ killing ability.  Let your doctor know if you are allergic to chlorhexidine.    Why do I need to take a preoperative antibacterial shower?  Skin is not sterile.  It is best to try to make your skin as free of germs as possible before surgery.  Proper cleansing with a germ killing soap before surgery can lower the number of germs on your skin.  This helps to reduce the risk of infection at the surgical site.  Following the instructions listed below will help you prepare your skin for surgery.      How do I use the CHG skin cleanser?  Steps:  Begin using your CHG soap five days before your scheduled surgery on ________________________.    Days 1-4 Shower before bed:  Wash your face and genitals with your normal soap and rinse.           2.    Apply the CHG soap to a clean wet washcloth.  Turn the water off or move away                From the water spray to avoid premature rinsing of the CHG soap as you are applying.     3.   Lather your entire body from the neck down.  Do not use on your face or genitals.  4. Pay special attention to the area(s) where your incision(s) will be located unless they are on your face.  Avoid scrubbing your skin too hard.  The important point is to have the CHG soap sit on your skin for 3 minutes.    When the 3 minutes are up, turn on the water and rinse the CHG soap off your body completely.   Pat yourself dry with a clean,  freshly-laundered towel.  Dress in clean, freshly laundered night clothes.    Be sure to change bed sheets and blankets at least on the first night of CHG body wash use. May change linens every night of the above protocol for maximum benefit.   Day 5:  Last shower is the morning of surgery: Follow above Instructions.    NOTE:        *Keep CHG soap out of eyes and ear canals   *DO NOT wash with regular soap on your body after you have used the CHG soap solution  *DO NOT apply powders, lotions, or perfume.  *Deodorant may be used days 1-4, BUT NOT the day of surgery    Who should I contact if I have any questions regarding the use of CHG soap?  Call the [facility], Preadmission Testing at [phone] if you have any questions.              Patient Information: Pre-Operative Infection Prevention Measures     Why did I have my nose, under my arms and groin swabbed?  The purpose of the swab is to identify Staphylococcus aureus inside your nose or on your skin.  The swab was sent to the laboratory for culture.  A positive swab/culture for Staphylococcus aureus is called colonization or carriage.      What is Staphylococcus aureus?  Staphylococcus aureus, also known as “staph”, is a germ found on the skin or in the nose of healthy people.  Sometimes Staphylococcus aureus can get into the body and cause an infection.  This can be minor (such as pimples, boils or other skin problems).  It might also be serious (such as blood infection, pneumonia or a surgical site infection).    What is Staphylococcus aureus colonization or carriage?  Colonization or carriage means that a person has the germ but is not sick from it.  These bacteria can be spread on the hands or when breathing or sneezing.    How is Staphylococcus aureus spread?  It is most often spread by close contact with a person or item that carries it.    What happens if my culture is positive for Staphylococcus aureus?  Your  doctor/medical team will use this information to guide any antibiotic treatment which may be necessary.  Regardless of the culture results, we will clean the inside of your nose with a betadine swab just before you have your surgery.      Will I get an infection if I have Staphylococcus aureus in my nose or on my skin?  Anyone can get an infection with Staphylococcus aureus.  However, the best way to reduce your risk of infection is to follow the instructions provided to you for the use of your CHG soap and dental rinse.        Who should I contact if I have any questions?  Call the [facility], Preadmission Testing at [phone] if you have any questions.           Preoperative Deep Breathing Exercises  Why it is important to do deep breathing exercises before my surgery?  Deep breathing exercises strengthen your breathing muscles.  This helps you to recover after your surgery and decreases the chance of breathing complications.  How are the deep breathing exercises done?  Sit straight with your back supported.  Breathe in deeply and slowly through your nose. Your lower rib cage should expand and your abdomen may move forward.  Hold that breath for 3 to 5 seconds.  Breathe out through pursed lips, slowly and completely.  Rest and repeat 10 times every hour while awake.  Rest longer if you become dizzy or lightheaded.        Preoperative Brain Exercises    What are brain exercises?  A brain exercise is any activity that engages your thinking (cognitive) skills.    What types of activities are considered brain exercises?  Jigsaw puzzles, crossword puzzles, word jumble, memory games, word search, and many more.  Many can be found free online or on your phone via a mobile mukesh.    Why should I do brain exercises before my surgery?  More recent research has shown brain exercise before surgery can lower the risk of postoperative delirium (confusion) which can be especially important for  older adults.  Patients who did brain exercises for 5 to 10 hours (total) for the 7-10 days before surgery, cut their risk of postoperative delirium in half up to 1 week after surgery.

## 2025-03-21 NOTE — CPM/PAT H&P
[facility]/PAT Evaluation       Name: Wil Dinero (Wil Dinero)  /Age: 1950/74 y.o.       Date of Consult: 3/21/25    Referring Provider: Dr. Bates    Surgery, Date, and Length:     Robot Assisted Right Inguinal Hernia Repair; Mesh Placement - Right   4/3/25, 150MIN    Wil Dinero is a 74 y.o. year-old male who presents to the [facility] for perioperative risk assessment prior to surgery.    Patient presents with a primary diagnosis of right inguinal hernia. Pt first noted scrotal swelling about 12 months ago.  No pain associated.  US scrotum revealed incarcerated loop of bowel.  No changes to bowel habits. No f/c/n/v.     This note was created in part upon personal review of patient's medical records.      Patient is scheduled to have Robot Assisted Right Inguinal Hernia Repair; Mesh Placement - Right      Pt denies any past history of anesthetic complications such as PONV, awareness, prolonged sedation, dental damage, aspiration, cardiac arrest, difficult intubation, difficult I.V. access or unexpected hospital admissions.  NO malignant hyperthermia and or pseudocholinesterase deficiency.  No history of blood transfusions     The patient is not a Taoist and will accept blood and blood products if medically indicated.   Type and screen sent.     Past Medical History:   Diagnosis Date    AAA (abdominal aortic aneurysm) (CMS-HCC)     noted in chart - ECHO 2017 - no aneurysm saw Dr. Wilcox 2018 - f/u prn    DVT (deep venous thrombosis) (Multi)     pt states only had DVT, no PE.  No documentation of DVT noted    Elevated AST (SGOT)     11.16.24: AST 43    Elevated BP without diagnosis of hypertension     home BPs WNL per pt    Elevated PSA     Gout     Hyperlipidemia     Hypothyroid     s/p DIAZ    Inguinal hernia     Inguinal hernia     Positive colorectal cancer screening using Cologuard test     years ago, followed by colonoscopy    Pulmonary embolism     2016 - bilateral PE - unsure  if provoked - xarelto x 3 months, now on ASA 81mg    Subclinical hyperthyroidism     Syncope     1-2 episodes >10 yrs ago, no recent issues    Tubular adenoma     with high grade dysplasia on colonoscopy 2017    Vitamin D deficiency        Past Surgical History:   Procedure Laterality Date    COLONOSCOPY  03/22/2017    Complete Colonoscopy    ROTATOR CUFF REPAIR Right 2022       Patient Sexual activity questions deferred to the physician.    Family History   Problem Relation Name Age of Onset    Hypertension Mother      Colon cancer Father      No Known Problems Brother       Social History     Socioeconomic History    Marital status:      Spouse name: Not on file    Number of children: 3    Years of education: Not on file    Highest education level: Not on file   Occupational History    Not on file   Tobacco Use    Smoking status: Never    Smokeless tobacco: Never   Vaping Use    Vaping status: Never Used   Substance and Sexual Activity    Alcohol use: Yes     Alcohol/week: 2.0 - 3.0 standard drinks of alcohol     Types: 2 - 3 Standard drinks or equivalent per week    Drug use: Never    Sexual activity: Defer   Other Topics Concern    Not on file   Social History Narrative    Not on file     Social Drivers of Health     Financial Resource Strain: Not on file   Food Insecurity: Not on file   Transportation Needs: Not on file   Physical Activity: Sufficiently Active (11/19/2024)    Exercise Vital Sign     Days of Exercise per Week: 3 days     Minutes of Exercise per Session: 60 min   Stress: Not on file   Social Connections: Not on file   Intimate Partner Violence: Not on file   Housing Stability: Not on file        No Known Allergies    Current Outpatient Medications   Medication Instructions    acetaminophen (TylenoL) 325 mg capsule Take by mouth.    aspirin 81 mg EC tablet 1 tablet, Daily    atorvastatin (LIPITOR) 20 mg, oral, Daily    cetirizine-pseudoephedrine (ZyrTEC-D) 5-120 mg 12 hr tablet 1 tablet, As  needed    chlorhexidine (Peridex) 0.12 % solution Swish for 30 seconds and spit 15mL of solution the night before and morning of surgery    cholecalciferol (Vitamin D-3) 50 mcg (2,000 unit) capsule Take by mouth.    fluticasone (Flonase) 50 mcg/actuation nasal spray 1 spray, Each Nostril, Daily, Shake gently. Before first use, prime pump. After use, clean tip and replace cap.    levothyroxine (Synthroid, Levoxyl) 75 mcg tablet Take 1 tab, once a day (first thing in the morning on an empty stomach with water. Wait 30 mins before eating/drinking/taking other meds. Wait 4 hours before taking calcium/iron/multivitamins)           PAT ROS:   Constitutional:    no fever   no chills   no unexpected weight change  Neuro/Psych:    no numbness   no weakness   no light-headedness   no confusion  Eyes:    no discharge   no pain   no vision loss   no diplopia   no visual disturbance   use of corrective lenses  Ears:    no ear pain   no hearing loss   no tinnitus  Nose:    no nasal discharge   no sinus congestion   no epistaxis  Mouth:    no dental issues   no mouth pain   no oral bleeding   no mouth lesions  Throat:    no throat pain   no dysphagia  Neck:    no neck pain   no neck stiffness  Cardio:    Functional 4 Mets. Patient denies SOB walking up 2 flights of stairs   [employer];  cooking, cleaning, grocery shopping, caring for 92yo mother   no chest pain   no palpitations   no peripheral edema   no dyspnea   no WESTON  Respiratory:    no cough   no wheezing   no hemoptysis   no shortness of breath  Endocrine:    no cold intolerance   no heat intolerance  GI:    no abdominal distention   no abdominal pain   no constipation   no diarrhea   no nausea   no vomiting   no blood in stool  :    no difficulty urinating   no dysuria   no oliguria  Musculoskeletal:    no arthralgias   no myalgias   no decreased ROM  Hematologic:    does not bruise/bleed easily   no excessive bleeding   no history of blood transfusion   blood  clots  Skin:   no skin changes   no sores/wound   no rash      Physical Exam  Constitutional:       General: He is not in acute distress.     Appearance: Normal appearance. He is not ill-appearing, toxic-appearing or diaphoretic.   HENT:      Head: Normocephalic and atraumatic.      Nose: Nose normal. No congestion or rhinorrhea.      Mouth/Throat:      Mouth: Mucous membranes are moist.      Pharynx: No posterior oropharyngeal erythema.   Eyes:      Extraocular Movements: Extraocular movements intact.      Conjunctiva/sclera: Conjunctivae normal.   Cardiovascular:      Rate and Rhythm: Normal rate and regular rhythm.      Pulses: Normal pulses.      Heart sounds: Normal heart sounds. No murmur heard.     No friction rub. No gallop.   Pulmonary:      Effort: Pulmonary effort is normal. No respiratory distress.      Breath sounds: Normal breath sounds. No stridor. No wheezing, rhonchi or rales.   Abdominal:      General: Bowel sounds are normal. There is no distension.      Palpations: Abdomen is soft. There is no mass.      Tenderness: There is no abdominal tenderness. There is no guarding or rebound.      Hernia: A hernia (right inguinal) is present.   Musculoskeletal:         General: No swelling, tenderness, deformity or signs of injury. Normal range of motion.      Cervical back: Normal range of motion and neck supple. No rigidity or tenderness.   Skin:     General: Skin is warm and dry.      Coloration: Skin is not jaundiced or pale.      Findings: No bruising, erythema, lesion or rash.   Neurological:      General: No focal deficit present.      Mental Status: He is alert and oriented to person, place, and time.      Cranial Nerves: No cranial nerve deficit.      Sensory: No sensory deficit.      Motor: No weakness.      Coordination: Coordination normal.   Psychiatric:         Mood and Affect: Mood normal.         Behavior: Behavior normal.          PAT AIRWAY:   Airway:     Mallampati::  III    Neck ROM::   "Full   Few missing teeth; no loose teeth           Visit Vitals  /66 Comment: manual   Pulse 97   Temp 37 °C (98.6 °F) (Temporal)   Resp 18   Ht 1.746 m (5' 8.75\")   Wt 103 kg (227 lb 1.2 oz)   SpO2 98%   BMI 33.78 kg/m²   Smoking Status Never   BSA 2.24 m²      LABS:  Lab Results   Component Value Date    WBC 3.0 (L) 03/21/2025    HGB 13.1 (L) 03/21/2025    HCT 38.8 (L) 03/21/2025     03/21/2025     03/21/2025      Lab Results   Component Value Date    GLUCOSE 90 03/21/2025    CALCIUM 9.1 03/21/2025     03/21/2025    K 3.6 03/21/2025    CO2 28 03/21/2025     03/21/2025    BUN 11 03/21/2025    CREATININE 0.88 03/21/2025      EKG 3/21/25  Sinus rhythm with 1st degree AV block  Otherwise normal EKG  Vent rate = 98 bpm    ECHO 10/11/17  Ao Root: 3.70 cm (2.0-3.7cm)  LAs:     4.30 cm (2.7-4.0cm)  Asc Ao, s: 3.3 cm     CONCLUSIONS:   1. The left ventricular systolic function is normal with a 55-60% estimated ejection fraction.   2. Spectral Doppler shows an impaired relaxation pattern of left ventricular diastolic filling.    ECHO 12/7/16  Ao Root: 4.00 cm (2.0-3.7cm)  LAs:     3.87 cm (2.7-4.0cm)  Asc Ao, d: 3.20 cm (2.1-3.4cm)   CONCLUSIONS:   1. The left ventricular systolic function is normal with a 65-70% estimated ejection fraction.   2. Spectral Doppler shows an impaired relaxation pattern of left ventricular diastolic filling.   3. The left atrium is normal in size.   4. RVSP within normal limits.   5. There is severe pulmonic valve regurgitation.   6. The estimated PASP is 32 mmHg.   7. There is plaque visualized in the ascending aorta.       Assessment and Plan:     74 y.o.  male  scheduled for Robot Assisted Right Inguinal Hernia Repair; Mesh Placement - Right on 4/3/25 with Dr. Bates for  right inguinal hernia.   Presents to [facility] today for perioperative risk stratification and optimization      Cardiovascular:  Patient has no active cardiac symptoms.   Patient denies any chest " "pain, tightness, heaviness, pressure, radiating pain, palpitations, irregular heartbeats, lightheadedness, cough, congestion, shortness of breath, WESTON, PND, near syncope, weight loss or gain.    METS: 4+  RCRI: 0 points, 3.9%  risk for postoperative MACE     HLD - cont statin on dos     Elevated BP -     AAA - pt saw Dr. De Guzman 2018 \"no aneurysm\" on most recent imaging 2018; follow up prn with cards    Pulmonary:  No pulmonary diagnosis, however patient is at increased risk of perioperative complications secondary to  age > 60, obesity, duration of surgery > 2 hours  Stop Bang score is 2 placing patient at low risk for JEFFERY  ARISCAT: <26 points, 1.6% risk of in-hospital postoperative pulmonary complication  PRODIGY: High risk for opioid induced respiratory depression    **Pt provided with deep breathing exercises during PAT visit today**    Endocrine:  Hypothyroidism - cont levothyroxine on dos     Vascular:  DVT/ PE - 2016 - took anticoagulants for some time; currently off without recurrence; cont ASA 81mg on dos      Neuro:  No neurologic diagnosis, however, the patient is at increased risk for perioperative delirium secondary to  age, type and duration of surgery, Patient instructed on and provided cognitive exercises    Hematology:  Anemima 3/21/25 H/H 13.1/38.8%    Patient instructed to ambulate as soon as possible postoperatively to decrease thromboembolic risk.   Initiate mechanical DVT prophylaxis as soon as possible and initiate chemical prophylaxis when deemed safe from a bleeding standpoint post surgery.     LABS: CBC, BMP, MRSA, EKG ordered. Lab results reviewed and unremarkable.    Followup: MRSA pending    Caprini: 8    Risk assessment complete.  Patient is scheduled for a intermediate surgical risk procedure.        Preoperative medication instructions were provided and reviewed with the patient.  Any additional testing or evaluation was explained to the patient.  Nothing by mouth instructions were " discussed and patient's questions were answered prior to conclusion to this encounter.  Patient verbalized understanding of preoperative instructions given in preadmission testing; discharge instructions available in EMR.    This note was dictated by a speech recognition.  Minor errors may have been detected in a speech recognition.

## 2025-03-21 NOTE — CPM/PAT NURSE NOTE
CPM/PAT Nurse Note      Name: Wil Dinero (Wil Rodriguezrbrough)  /Age: 1950/74 y.o.       Past Medical History:   Diagnosis Date    AAA (abdominal aortic aneurysm) (CMS-HCC)     noted in chart - ECHO 2017 - no aneurysm saw Dr. Wilcox 2018 - f/u prn    DVT (deep venous thrombosis) (Multi)     pt states only had DVT, no PE.  No documentation of DVT noted    Elevated AST (SGOT)     11.16.24: AST 43    Elevated BP without diagnosis of hypertension     home BPs WNL per pt    Elevated PSA     Gout     Hyperlipidemia     Hypothyroid     s/p DIAZ    Inguinal hernia     Inguinal hernia     Positive colorectal cancer screening using Cologuard test     years ago, followed by colonoscopy    Pulmonary embolism     2016 - bilateral PE - unsure if provoked - xarelto x 3 months, now on ASA 81mg    Subclinical hyperthyroidism     Syncope     1-2 episodes >10 yrs ago, no recent issues    Tubular adenoma     with high grade dysplasia on colonoscopy 2017    Vitamin D deficiency        Past Surgical History:   Procedure Laterality Date    COLONOSCOPY  2017    Complete Colonoscopy    ROTATOR CUFF REPAIR Right        Patient Sexual activity questions deferred to the physician.    Family History   Problem Relation Name Age of Onset    Hypertension Mother      Colon cancer Father      No Known Problems Brother         No Known Allergies    Prior to Admission medications    Medication Sig Start Date End Date Taking? Authorizing Provider   acetaminophen (TylenoL) 325 mg capsule Take by mouth.    Historical Provider, MD   aspirin 81 mg EC tablet Take 1 tablet (81 mg) by mouth once daily. 17   Historical Provider, MD   atorvastatin (Lipitor) 20 mg tablet Take 1 tablet (20 mg) by mouth once daily. 24   Fletcher Estrella MD   cetirizine-pseudoephedrine (ZyrTEC-D) 5-120 mg 12 hr tablet Take 1 tablet by mouth if needed.    Historical Provider, MD   chlorhexidine (Peridex) 0.12 % solution Swish for 30 seconds and  spit 15mL of solution the night before and morning of surgery 3/21/25   Annika Birmingham PA-C   cholecalciferol (Vitamin D-3) 50 mcg (2,000 unit) capsule Take by mouth. 12/9/14   Historical Provider, MD   fluticasone (Flonase) 50 mcg/actuation nasal spray Administer 1 spray into each nostril once daily. Shake gently. Before first use, prime pump. After use, clean tip and replace cap. 11/19/24 11/19/25  Fletcher Estrella MD   levothyroxine (Synthroid, Levoxyl) 75 mcg tablet Take 1 tab, once a day (first thing in the morning on an empty stomach with water. Wait 30 mins before eating/drinking/taking other meds. Wait 4 hours before taking calcium/iron/multivitamins) 9/3/24   Valentina Avilez MD        PAT ROS     DASI Risk Score    No data to display       Caprini DVT Assessment    No data to display       Modified Frailty Index    No data to display       [id number] Stroke Risk Points  Current as of just now        N/A 0 to 9 Points:      Last Change: N/A          The [id number]-[id number] risk score (Lip GH, et al. 2009. © 2010 American College [employer]) quantifies the risk of stroke for a patient with atrial fibrillation. For patients without atrial fibrillation or under the age of 18 this score appears as N/A. Higher score values generally indicate higher risk of stroke.        This score is not applicable to this patient. Components are not calculated.          Revised Cardiac Risk Index    No data to display       Apfel Simplified Score    No data to display       Risk Analysis Index Results This Encounter    No data found in the last 10 encounters.       Prodigy: High Risk  Total Score: 20              Prodigy Age Score      Prodigy Gender Score          ARISCAT Score for Postoperative Pulmonary Complications    No data to display       Mayes Perioperative Risk for Myocardial Infarction or Cardiac Arrest (MARILEE)    No data to display         Nurse Plan of Action:   After Visit Summary (AVS) reviewed and  patient verbalized good understanding of medications and NPO instructions.

## 2025-03-21 NOTE — CPM/PAT NURSE NOTE
CPM/PAT Nurse Note      Name: Wil Dinero (Wil Rodriguezrbrough)  /Age: 1950/74 y.o.       Past Medical History:   Diagnosis Date    AAA (abdominal aortic aneurysm) (CMS-HCC)     noted in chart - ECHO 2017 - no aneurysm saw Dr. Wilcox 2018 - f/u prn    DVT (deep venous thrombosis) (Multi)     pt states only had DVT, no PE.  No documentation of DVT noted    Elevated AST (SGOT)     11.16.24: AST 43    Elevated BP without diagnosis of hypertension     home BPs WNL per pt    Elevated PSA     Gout     Hyperlipidemia     Hypothyroid     s/p DIAZ    Inguinal hernia     Inguinal hernia     Positive colorectal cancer screening using Cologuard test     years ago, followed by colonoscopy    Pulmonary embolism     2016 - bilateral PE - unsure if provoked - xarelto x 3 months, now on ASA 81mg    Subclinical hyperthyroidism     Syncope     1-2 episodes >10 yrs ago, no recent issues    Tubular adenoma     with high grade dysplasia on colonoscopy 2017    Vitamin D deficiency        Past Surgical History:   Procedure Laterality Date    COLONOSCOPY  2017    Complete Colonoscopy    ROTATOR CUFF REPAIR Right        Patient Sexual activity questions deferred to the physician.    Family History   Problem Relation Name Age of Onset    Hypertension Mother      Colon cancer Father      No Known Problems Brother         No Known Allergies    Prior to Admission medications    Medication Sig Start Date End Date Taking? Authorizing Provider   acetaminophen (TylenoL) 325 mg capsule Take by mouth.    Historical Provider, MD   aspirin 81 mg EC tablet Take 1 tablet (81 mg) by mouth once daily. 17   Historical Provider, MD   atorvastatin (Lipitor) 20 mg tablet Take 1 tablet (20 mg) by mouth once daily. 24   Fletcher Estrella MD   cetirizine-pseudoephedrine (ZyrTEC-D) 5-120 mg 12 hr tablet Take 1 tablet by mouth if needed.    Historical Provider, MD   chlorhexidine (Peridex) 0.12 % solution Swish for 30 seconds and  spit 15mL of solution the night before and morning of surgery 3/21/25   Annika Birmingham PA-C   cholecalciferol (Vitamin D-3) 50 mcg (2,000 unit) capsule Take by mouth. 12/9/14   Historical Provider, MD   fluticasone (Flonase) 50 mcg/actuation nasal spray Administer 1 spray into each nostril once daily. Shake gently. Before first use, prime pump. After use, clean tip and replace cap. 11/19/24 11/19/25  Fletcher Estrella MD   levothyroxine (Synthroid, Levoxyl) 75 mcg tablet Take 1 tab, once a day (first thing in the morning on an empty stomach with water. Wait 30 mins before eating/drinking/taking other meds. Wait 4 hours before taking calcium/iron/multivitamins) 9/3/24   Valentina Avilez MD        PAT ROS     DASI Risk Score    No data to display       Caprini DVT Assessment    No data to display       Modified Frailty Index    No data to display       [id number] Stroke Risk Points  Current as of just now        N/A 0 to 9 Points:      Last Change: N/A          The OXX1VW9-DYHw risk score (Lip GH, et al. 2009. © 2010 American College [employer] Chest [employer]icians) quantifies the risk of stroke for a patient with atrial fibrillation. For patients without atrial fibrillation or under the age of 18 this score appears as N/A. Higher score values generally indicate higher risk of stroke.        This score is not applicable to this patient. Components are not calculated.          Revised Cardiac Risk Index    No data to display       Apfel Simplified Score    No data to display       Risk Analysis Index Results This Encounter    No data found in the last 10 encounters.       Prodigy: High Risk  Total Score: 20              Prodigy Age Score      Prodigy Gender Score          ARISCAT Score for Postoperative Pulmonary Complications    No data to display       Mayes Perioperative Risk for Myocardial Infarction or Cardiac Arrest (MARILEE)    No data to display         Nurse Plan of Action: After Visit Summary (AVS) reviewed and patient  verbalized good understanding of medications and NPO instructions.  Pre-op infection prevention measures:  CHG showers and mouthwash reviewed, understanding voiced.  CHG soap given and patient verbalized need to pick CHG mouthwash at their preferred local pharmacy.

## 2025-03-21 NOTE — CPM/PAT NURSE NOTE
CPM/PAT Nurse Note      Name: Wil Dinero (Wil Rodriguezrbrough)  /Age: 1950/74 y.o.       Past Medical History:   Diagnosis Date    AAA (abdominal aortic aneurysm) (CMS-HCC)     noted in chart - ECHO 2017 - no aneurysm saw Dr. Wilcox 2018 - f/u prn    DVT (deep venous thrombosis) (Multi)     pt states only had DVT, no PE.  No documentation of DVT noted    Elevated AST (SGOT)     11.16.24: AST 43    Elevated BP without diagnosis of hypertension     home BPs WNL per pt    Elevated PSA     Gout     Hyperlipidemia     Hypothyroid     s/p DIAZ    Inguinal hernia     Inguinal hernia     Positive colorectal cancer screening using Cologuard test     years ago, followed by colonoscopy    Pulmonary embolism     2016 - bilateral PE - unsure if provoked - xarelto x 3 months, now on ASA 81mg    Subclinical hyperthyroidism     Syncope     1-2 episodes >10 yrs ago, no recent issues    Tubular adenoma     with high grade dysplasia on colonoscopy 2017    Vitamin D deficiency        Past Surgical History:   Procedure Laterality Date    COLONOSCOPY  2017    Complete Colonoscopy    ROTATOR CUFF REPAIR Right        Patient Sexual activity questions deferred to the physician.    Family History   Problem Relation Name Age of Onset    Hypertension Mother      Colon cancer Father      No Known Problems Brother         No Known Allergies    Prior to Admission medications    Medication Sig Start Date End Date Taking? Authorizing Provider   acetaminophen (TylenoL) 325 mg capsule Take by mouth.    Historical Provider, MD   aspirin 81 mg EC tablet Take 1 tablet (81 mg) by mouth once daily. 17   Historical Provider, MD   atorvastatin (Lipitor) 20 mg tablet Take 1 tablet (20 mg) by mouth once daily. 24   Fletcher Estrella MD   cetirizine-pseudoephedrine (ZyrTEC-D) 5-120 mg 12 hr tablet Take 1 tablet by mouth if needed.    Historical Provider, MD   chlorhexidine (Peridex) 0.12 % solution Swish for 30 seconds and  spit 15mL of solution the night before and morning of surgery 3/21/25   Annika Birmingham PA-C   cholecalciferol (Vitamin D-3) 50 mcg (2,000 unit) capsule Take by mouth. 12/9/14   Historical Provider, MD   fluticasone (Flonase) 50 mcg/actuation nasal spray Administer 1 spray into each nostril once daily. Shake gently. Before first use, prime pump. After use, clean tip and replace cap. 11/19/24 11/19/25  Fletcher Estrella MD   levothyroxine (Synthroid, Levoxyl) 75 mcg tablet Take 1 tab, once a day (first thing in the morning on an empty stomach with water. Wait 30 mins before eating/drinking/taking other meds. Wait 4 hours before taking calcium/iron/multivitamins) 9/3/24   Valentina Avilez MD        PAT ROS     DASI Risk Score    No data to display       Caprini DVT Assessment    No data to display       Modified Frailty Index    No data to display       [id number] Stroke Risk Points  Current as of just now        N/A 0 to 9 Points:      Last Change: N/A          The [id number]-VWEd risk score (Lip GH, et al. 2009. © 2010 American College [employer] Chest [employer]icians) quantifies the risk of stroke for a patient with atrial fibrillation. For patients without atrial fibrillation or under the age of 18 this score appears as N/A. Higher score values generally indicate higher risk of stroke.        This score is not applicable to this patient. Components are not calculated.          Revised Cardiac Risk Index    No data to display       Apfel Simplified Score    No data to display       Risk Analysis Index Results This Encounter    No data found in the last 10 encounters.       Prodigy: High Risk  Total Score: 20              Prodigy Age Score      Prodigy Gender Score          ARISCAT Score for Postoperative Pulmonary Complications    No data to display       Mayes Perioperative Risk for Myocardial Infarction or Cardiac Arrest (MARILEE)    No data to display         Nurse Plan of Action: After Visit Summary (AVS) reviewed and patient  verbalized good understanding of medications and NPO instructions.  Pre-op infection prevention measures:  CHG showers and mouthwash reviewed, understanding voiced.  CHG soap given and patient verbalized need to pick CHG mouthwash at their preferred local pharmacy.

## 2025-03-23 LAB — STAPHYLOCOCCUS SPEC CULT: NORMAL

## 2025-04-02 ENCOUNTER — ANESTHESIA EVENT (OUTPATIENT)
Dept: OPERATING ROOM | Facility: HOSPITAL | Age: 75
End: 2025-04-02
Payer: MEDICARE

## 2025-04-03 ENCOUNTER — ANESTHESIA (OUTPATIENT)
Dept: OPERATING ROOM | Facility: HOSPITAL | Age: 75
End: 2025-04-03
Payer: MEDICARE

## 2025-04-03 ENCOUNTER — HOSPITAL ENCOUNTER (OUTPATIENT)
Facility: HOSPITAL | Age: 75
Setting detail: OUTPATIENT SURGERY
Discharge: HOME | End: 2025-04-03
Attending: SURGERY | Admitting: SURGERY
Payer: MEDICARE

## 2025-04-03 VITALS
TEMPERATURE: 97.5 F | HEIGHT: 68 IN | RESPIRATION RATE: 16 BRPM | DIASTOLIC BLOOD PRESSURE: 64 MMHG | BODY MASS INDEX: 35.15 KG/M2 | WEIGHT: 231.92 LBS | SYSTOLIC BLOOD PRESSURE: 133 MMHG | OXYGEN SATURATION: 94 % | HEART RATE: 83 BPM

## 2025-04-03 DIAGNOSIS — K40.90 NON-RECURRENT UNILATERAL INGUINAL HERNIA WITHOUT OBSTRUCTION OR GANGRENE: ICD-10-CM

## 2025-04-03 DIAGNOSIS — K40.90 INGUINAL HERNIA WITHOUT OBSTRUCTION OR GANGRENE, RECURRENCE NOT SPECIFIED, UNSPECIFIED LATERALITY: Primary | ICD-10-CM

## 2025-04-03 PROCEDURE — C1781 MESH (IMPLANTABLE): HCPCS | Performed by: SURGERY

## 2025-04-03 PROCEDURE — 7100000001 HC RECOVERY ROOM TIME - INITIAL BASE CHARGE: Performed by: SURGERY

## 2025-04-03 PROCEDURE — 2500000005 HC RX 250 GENERAL PHARMACY W/O HCPCS: Performed by: ANESTHESIOLOGY

## 2025-04-03 PROCEDURE — 3600000017 HC OR TIME - EACH INCREMENTAL 1 MINUTE - PROCEDURE LEVEL SIX: Performed by: SURGERY

## 2025-04-03 PROCEDURE — 3600000018 HC OR TIME - INITIAL BASE CHARGE - PROCEDURE LEVEL SIX: Performed by: SURGERY

## 2025-04-03 PROCEDURE — 2500000004 HC RX 250 GENERAL PHARMACY W/ HCPCS (ALT 636 FOR OP/ED): Performed by: SURGERY

## 2025-04-03 PROCEDURE — 7100000010 HC PHASE TWO TIME - EACH INCREMENTAL 1 MINUTE: Performed by: SURGERY

## 2025-04-03 PROCEDURE — 7100000009 HC PHASE TWO TIME - INITIAL BASE CHARGE: Performed by: SURGERY

## 2025-04-03 PROCEDURE — 2500000004 HC RX 250 GENERAL PHARMACY W/ HCPCS (ALT 636 FOR OP/ED)

## 2025-04-03 PROCEDURE — 3700000002 HC GENERAL ANESTHESIA TIME - EACH INCREMENTAL 1 MINUTE: Performed by: SURGERY

## 2025-04-03 PROCEDURE — 49650 LAP ING HERNIA REPAIR INIT: CPT | Performed by: SURGERY

## 2025-04-03 PROCEDURE — 7100000002 HC RECOVERY ROOM TIME - EACH INCREMENTAL 1 MINUTE: Performed by: SURGERY

## 2025-04-03 PROCEDURE — 2720000007 HC OR 272 NO HCPCS: Performed by: SURGERY

## 2025-04-03 PROCEDURE — 3700000001 HC GENERAL ANESTHESIA TIME - INITIAL BASE CHARGE: Performed by: SURGERY

## 2025-04-03 PROCEDURE — 2780000003 HC OR 278 NO HCPCS: Performed by: SURGERY

## 2025-04-03 DEVICE — 3DMAX MID ANATOMICAL MESH, 12 CM X 17 CM (5" X 7"), EXTRA LARGE, RIGHT
Type: IMPLANTABLE DEVICE | Site: INGUINAL | Status: FUNCTIONAL
Brand: 3DMAX

## 2025-04-03 RX ORDER — LIDOCAINE HYDROCHLORIDE 20 MG/ML
INJECTION, SOLUTION EPIDURAL; INFILTRATION; INTRACAUDAL; PERINEURAL AS NEEDED
Status: DISCONTINUED | OUTPATIENT
Start: 2025-04-03 | End: 2025-04-03

## 2025-04-03 RX ORDER — KETOROLAC TROMETHAMINE 30 MG/ML
INJECTION, SOLUTION INTRAMUSCULAR; INTRAVENOUS AS NEEDED
Status: DISCONTINUED | OUTPATIENT
Start: 2025-04-03 | End: 2025-04-03

## 2025-04-03 RX ORDER — PHENYLEPHRINE HCL IN 0.9% NACL 1 MG/10 ML
SYRINGE (ML) INTRAVENOUS AS NEEDED
Status: DISCONTINUED | OUTPATIENT
Start: 2025-04-03 | End: 2025-04-03

## 2025-04-03 RX ORDER — FENTANYL CITRATE 50 UG/ML
INJECTION, SOLUTION INTRAMUSCULAR; INTRAVENOUS AS NEEDED
Status: DISCONTINUED | OUTPATIENT
Start: 2025-04-03 | End: 2025-04-03

## 2025-04-03 RX ORDER — SODIUM CHLORIDE, SODIUM LACTATE, POTASSIUM CHLORIDE, CALCIUM CHLORIDE 600; 310; 30; 20 MG/100ML; MG/100ML; MG/100ML; MG/100ML
100 INJECTION, SOLUTION INTRAVENOUS CONTINUOUS
Status: DISCONTINUED | OUTPATIENT
Start: 2025-04-03 | End: 2025-04-03 | Stop reason: HOSPADM

## 2025-04-03 RX ORDER — OXYCODONE HYDROCHLORIDE 5 MG/1
5 TABLET ORAL EVERY 4 HOURS PRN
Status: DISCONTINUED | OUTPATIENT
Start: 2025-04-03 | End: 2025-04-03 | Stop reason: HOSPADM

## 2025-04-03 RX ORDER — ROCURONIUM BROMIDE 10 MG/ML
INJECTION, SOLUTION INTRAVENOUS AS NEEDED
Status: DISCONTINUED | OUTPATIENT
Start: 2025-04-03 | End: 2025-04-03

## 2025-04-03 RX ORDER — MIDAZOLAM HYDROCHLORIDE 1 MG/ML
INJECTION INTRAMUSCULAR; INTRAVENOUS AS NEEDED
Status: DISCONTINUED | OUTPATIENT
Start: 2025-04-03 | End: 2025-04-03

## 2025-04-03 RX ORDER — CEFAZOLIN 1 G/1
INJECTION, POWDER, FOR SOLUTION INTRAVENOUS AS NEEDED
Status: DISCONTINUED | OUTPATIENT
Start: 2025-04-03 | End: 2025-04-03

## 2025-04-03 RX ORDER — ONDANSETRON HYDROCHLORIDE 2 MG/ML
INJECTION, SOLUTION INTRAVENOUS AS NEEDED
Status: DISCONTINUED | OUTPATIENT
Start: 2025-04-03 | End: 2025-04-03

## 2025-04-03 RX ORDER — LIDOCAINE HYDROCHLORIDE 10 MG/ML
0.1 INJECTION, SOLUTION EPIDURAL; INFILTRATION; INTRACAUDAL; PERINEURAL ONCE
Status: DISCONTINUED | OUTPATIENT
Start: 2025-04-03 | End: 2025-04-03 | Stop reason: HOSPADM

## 2025-04-03 RX ORDER — SODIUM CHLORIDE, SODIUM LACTATE, POTASSIUM CHLORIDE, CALCIUM CHLORIDE 600; 310; 30; 20 MG/100ML; MG/100ML; MG/100ML; MG/100ML
INJECTION, SOLUTION INTRAVENOUS CONTINUOUS PRN
Status: DISCONTINUED | OUTPATIENT
Start: 2025-04-03 | End: 2025-04-03

## 2025-04-03 RX ORDER — PROPOFOL 10 MG/ML
INJECTION, EMULSION INTRAVENOUS AS NEEDED
Status: DISCONTINUED | OUTPATIENT
Start: 2025-04-03 | End: 2025-04-03

## 2025-04-03 RX ORDER — ONDANSETRON HYDROCHLORIDE 2 MG/ML
4 INJECTION, SOLUTION INTRAVENOUS ONCE AS NEEDED
Status: DISCONTINUED | OUTPATIENT
Start: 2025-04-03 | End: 2025-04-03 | Stop reason: HOSPADM

## 2025-04-03 RX ORDER — OXYCODONE HYDROCHLORIDE 5 MG/1
5 TABLET ORAL EVERY 6 HOURS PRN
Qty: 12 TABLET | Refills: 0 | Status: SHIPPED | OUTPATIENT
Start: 2025-04-03

## 2025-04-03 RX ADMIN — CEFAZOLIN 2 G: 1 INJECTION, POWDER, FOR SOLUTION INTRAMUSCULAR; INTRAVENOUS at 07:50

## 2025-04-03 RX ADMIN — Medication 100 MCG: at 09:11

## 2025-04-03 RX ADMIN — Medication 100 MCG: at 08:41

## 2025-04-03 RX ADMIN — PROPOFOL 170 MG: 10 INJECTION, EMULSION INTRAVENOUS at 07:38

## 2025-04-03 RX ADMIN — MIDAZOLAM HYDROCHLORIDE 2 MG: 1 INJECTION, SOLUTION INTRAMUSCULAR; INTRAVENOUS at 07:28

## 2025-04-03 RX ADMIN — DEXAMETHASONE SODIUM PHOSPHATE 8 MG: 4 INJECTION, SOLUTION INTRAMUSCULAR; INTRAVENOUS at 07:50

## 2025-04-03 RX ADMIN — KETOROLAC TROMETHAMINE 30 MG: 30 INJECTION, SOLUTION INTRAMUSCULAR at 09:26

## 2025-04-03 RX ADMIN — Medication 100 MCG: at 08:46

## 2025-04-03 RX ADMIN — ROCURONIUM BROMIDE 20 MG: 10 INJECTION INTRAVENOUS at 08:05

## 2025-04-03 RX ADMIN — ROCURONIUM BROMIDE 10 MG: 10 INJECTION INTRAVENOUS at 09:20

## 2025-04-03 RX ADMIN — FENTANYL CITRATE 100 MCG: 50 INJECTION, SOLUTION INTRAMUSCULAR; INTRAVENOUS at 07:38

## 2025-04-03 RX ADMIN — SODIUM CHLORIDE, POTASSIUM CHLORIDE, SODIUM LACTATE AND CALCIUM CHLORIDE: 600; 310; 30; 20 INJECTION, SOLUTION INTRAVENOUS at 09:15

## 2025-04-03 RX ADMIN — Medication 100 MCG: at 08:51

## 2025-04-03 RX ADMIN — ONDANSETRON 4 MG: 2 INJECTION, SOLUTION INTRAMUSCULAR; INTRAVENOUS at 08:30

## 2025-04-03 RX ADMIN — SUGAMMADEX 200 MG: 100 INJECTION, SOLUTION INTRAVENOUS at 09:36

## 2025-04-03 RX ADMIN — ROCURONIUM BROMIDE 60 MG: 10 INJECTION INTRAVENOUS at 07:38

## 2025-04-03 RX ADMIN — PROPOFOL 30 MG: 10 INJECTION, EMULSION INTRAVENOUS at 09:20

## 2025-04-03 RX ADMIN — LIDOCAINE HYDROCHLORIDE 100 MG: 20 INJECTION, SOLUTION EPIDURAL; INFILTRATION; INTRACAUDAL; PERINEURAL at 07:38

## 2025-04-03 RX ADMIN — Medication 5 L/MIN: at 09:48

## 2025-04-03 RX ADMIN — ROCURONIUM BROMIDE 20 MG: 10 INJECTION INTRAVENOUS at 08:41

## 2025-04-03 RX ADMIN — SODIUM CHLORIDE, POTASSIUM CHLORIDE, SODIUM LACTATE AND CALCIUM CHLORIDE: 600; 310; 30; 20 INJECTION, SOLUTION INTRAVENOUS at 07:27

## 2025-04-03 RX ADMIN — Medication 100 MCG: at 08:13

## 2025-04-03 SDOH — HEALTH STABILITY: MENTAL HEALTH: CURRENT SMOKER: 0

## 2025-04-03 ASSESSMENT — PAIN - FUNCTIONAL ASSESSMENT
PAIN_FUNCTIONAL_ASSESSMENT: 0-10

## 2025-04-03 ASSESSMENT — PAIN DESCRIPTION - DESCRIPTORS: DESCRIPTORS: ACHING

## 2025-04-03 ASSESSMENT — PAIN SCALES - GENERAL
PAINLEVEL_OUTOF10: 3
PAIN_LEVEL: 4
PAINLEVEL_OUTOF10: 0 - NO PAIN
PAINLEVEL_OUTOF10: 3
PAINLEVEL_OUTOF10: 3

## 2025-04-03 ASSESSMENT — COLUMBIA-SUICIDE SEVERITY RATING SCALE - C-SSRS
2. HAVE YOU ACTUALLY HAD ANY THOUGHTS OF KILLING YOURSELF?: NO
6. HAVE YOU EVER DONE ANYTHING, STARTED TO DO ANYTHING, OR PREPARED TO DO ANYTHING TO END YOUR LIFE?: NO
1. IN THE PAST MONTH, HAVE YOU WISHED YOU WERE DEAD OR WISHED YOU COULD GO TO SLEEP AND NOT WAKE UP?: NO

## 2025-04-03 NOTE — PERIOPERATIVE NURSING NOTE
0943: patient arrived to pacu, somnolent but arousable to voice. Bedside report received from anesthesia and procedure nurse. Initial vital signs and assessment complete and charted in flowsheets. Patient currently denies pain or nausea. Incision sites clean dry and intact, Ice applied for comfort. Safety maintained  0954: Family updated via messenger  0958: Dr Bates at bedside. No new orders at this time  1005: Pt tolerating water at this time. Denies nausea  1011: Pt tolerating cordell crackers at this time. Denies nausea or pain at this time.  1015: Pt states pain is tolerable for him at this time, just mild discomfort at incision site. Does not want medication at this time  1030: Pt meets criteria for discharge at this time

## 2025-04-03 NOTE — ANESTHESIA PROCEDURE NOTES
Airway  Date/Time: 4/3/2025 7:43 AM  Urgency: elective    Difficult airway    Staffing  Performed: CRNA   Authorized by: Frantz Cannon MD    Performed by: Tonja Benítez RN  Patient location during procedure: OR    Indications and Patient Condition  Indications for airway management: anesthesia and airway protection  Spontaneous Ventilation: absent  Sedation level: deep  Preoxygenated: yes  Patient position: sniffing  MILS not maintained throughout  Mask difficulty assessment: 2 - vent by mask + OA or adjuvant +/- NMBA    Final Airway Details  Final airway type: endotracheal airway      Successful airway: ETT     Successful intubation technique: direct laryngoscopy  Facilitating devices/methods: intubating stylet and cricoid pressure  Endotracheal tube insertion site: oral  Blade: Harriet  Blade size: #4  ETT size (mm): 7.5  Cormack-Lehane Classification: grade III - view of epiglottis only  Placement verified by: chest auscultation and capnometry   Measured from: lips  ETT to lips (cm): 23  Number of attempts at approach: 2  Ventilation between attempts: BVM    Additional Comments  Difficult intubation with minimal view of base of vocal cords requiring strong head lift with laryngoscopy.  #16 Fr OG tube atraumatically placed to low-intermittent suction per surgeon request

## 2025-04-03 NOTE — ANESTHESIA POSTPROCEDURE EVALUATION
Patient: Wil Dinero    Procedure Summary       Date: 04/03/25 Room / Location: [facility] OR 08 / [facility] OR    Anesthesia Start: 0727 Anesthesia Stop: 0947    Procedure: Robot Assisted Right Inguinal Hernia Repair; Mesh Placement (Right: Abdomen) Diagnosis:       Inguinal hernia without obstruction or gangrene, recurrence not specified, unspecified laterality      (Inguinal hernia without obstruction or gangrene, recurrence not specified, unspecified laterality [K40.90])    Surgeons: Brian Bates MD Responsible Provider: Frantz Cannon MD    Anesthesia Type: general ASA Status: 3            Anesthesia Type: general    Vitals Value Taken Time   /62 04/03/25 1000   Temp 36.5 °C (97.7 °F) 04/03/25 0943   Pulse 83 04/03/25 1013   Resp 14 04/03/25 1000   SpO2 93 % 04/03/25 1013   Vitals shown include unfiled device data.    Anesthesia Post Evaluation    Patient location during evaluation: PACU  Patient participation: complete - patient participated  Level of consciousness: awake and alert  Pain score: 4  Pain management: adequate  Airway patency: patent  Cardiovascular status: acceptable  Respiratory status: acceptable  Hydration status: acceptable  Postoperative Nausea and Vomiting: none    There were no known notable events for this encounter.

## 2025-04-03 NOTE — ANESTHESIA PREPROCEDURE EVALUATION
Patient: Wil Dinero    Procedure Information       Anesthesia Start Date/Time: 04/03/25 0727    Procedure: Robot Assisted Right Inguinal Hernia Repair; Mesh Placement (Right: Abdomen)    Location: [facility] A OR 08 / [facility] A OR    Surgeons: Brian Bates MD            Relevant Problems   Cardiac   (+) Hyperlipemia   (+) Pure hypercholesterolemia   (+) White coat syndrome without hypertension      Pulmonary   (+) Pulmonary embolism      Endocrine   (+) Hypothyroidism, postradioiodine therapy   (+) Subclinical hyperthyroidism   (+) Toxic adenoma      HEENT   (+) Sinusitis       Clinical information reviewed:   Tobacco  Allergies  Meds   Med Hx  Surg Hx   Fam Hx  Soc Hx        NPO Detail:  NPO/Void Status  Date of Last Liquid: 04/03/25  Time of Last Liquid: 0400  Date of Last Solid: 04/02/25  Time of Last Solid: 2100         Physical Exam    Airway  Mallampati: I  TM distance: >3 FB  Neck ROM: full     Cardiovascular - normal exam     Dental - normal exam     Pulmonary - normal exam     Abdominal - normal exam         Anesthesia Plan    History of general anesthesia?: yes  History of complications of general anesthesia?: no    ASA 3     general     The patient is not a current smoker.  Patient was not previously instructed to abstain from smoking on day of procedure.  Patient did not smoke on day of procedure.    intravenous induction   Postoperative administration of opioids is intended.  Anesthetic plan and risks discussed with patient.  Use of blood products discussed with patient who.    Plan discussed with CAA.

## 2025-04-03 NOTE — OP NOTE
Robot Assisted Right Inguinal Hernia Repair; Mesh Placement (R) Operative Note     Date: 4/3/2025  OR Location: [facility] A OR    Name: Wil Dinero, : 1950, Age: 74 y.o., MRN: [id number], Sex: male    Diagnosis  Pre-op Diagnosis      *Massive Right  Inguinal hernia without obstruction or gangrene, recurrence not specified, unspecified laterality [K40.90] Post-op Diagnosis       *Massive Right  Inguinal hernia without obstruction or gangrene, recurrence not specified, unspecified laterality [K40.90]     Procedures  Robot Assisted Right Inguinal Hernia Repair; Mesh Placement  [id number] - DE LAPAROSCOPY SURG RPR INITIAL INGUINAL HERNIA      Surgeons      * Brian Bates - Primary    Resident/Fellow/Other Assistant:  Surgeons and Role:  * No surgeons found with a matching role *    Staff:   Circulator: Jyoti  Scrub Person: Luther Jackub Person: Pretty Lozano Scrub: Alcira Lozano Circulator: Estella    Anesthesia Staff: Anesthesiologist: Frantz Cannon MD  CRNA: BARBARA JeffreyCRNA; ALON Vidal-RAMIN  SRNA: Tonja Benítez RN    Procedure Summary  Anesthesia: General  ASA: III  Estimated Blood Loss: 8 mL  Intra-op Medications:   Administrations occurring from 0730 to 1015 on 25:   Medication Name Total Dose   BUPivacaine HCl (Marcaine) 0.5 % (5 mg/mL) 30 mL, lidocaine PF (Xylocaine) 10 mg/mL (1 %) 30 mL syringe 13 mL   ceFAZolin (Ancef) vial 1 g 2 g   dexAMETHasone (Decadron) injection 4 mg/mL 8 mg   fentaNYL (Sublimaze) injection 50 mcg/mL 100 mcg   ketorolac (Toradol) injection 30 mg 30 mg   lactated Ringer's infusion Cannot be calculated   lidocaine PF (Xylocaine-MPF) local injection 2 % 100 mg   ondansetron (Zofran) 2 mg/mL injection 4 mg   phenylephrine 100 mcg/mL syringe 10 mL (prefilled) 500 mcg   propofol (Diprivan) injection 10 mg/mL 200 mg   rocuronium (ZeMuron) 50 mg/5 mL injection 110 mg   sugammadex (Bridion) 200 mg/2 mL injection 200 mg              Anesthesia Record                Intraprocedure I/O Totals          Intake    lactated Ringer's 1100.00 mL    Total Intake 1100 mL       Output    Urine 200 mL    Est. Blood Loss 20 mL    Total Output 220 mL       Net    Net Volume 880 mL          Specimen: No specimens collected              Drains and/or Catheters:   [REMOVED] Urethral Catheter Non-latex 16 Fr. (Removed)       Findings: very large indirect right inguinal hernia incarcerated with loops of small and large bowel     Indications: Wil Dinero is an 74 y.o. male who is having surgery for Inguinal hernia without obstruction or gangrene, recurrence not specified, unspecified laterality [K40.90].     The patient was seen in the preoperative area. The risks, benefits, complications, treatment options, non-operative alternatives, expected recovery and outcomes were discussed with the patient. The possibilities of reaction to medication, pulmonary aspiration, injury to surrounding structures, bleeding, recurrent infection, the need for additional procedures, failure to diagnose a condition, and creating a complication requiring transfusion or operation were discussed with the patient. The patient concurred with the proposed plan, giving informed consent.  The site of surgery was properly noted/marked if necessary per policy. The patient has been actively warmed in preoperative area. Preoperative antibiotics have been ordered and given within 1 hours of incision. Venous thrombosis prophylaxis have been ordered including bilateral sequential compression devices     Procedure Details:Patient is a 74-year-old gentleman who comes in for elective repair of a very large right inguinal hernia that extends down into the scrotum.  Ultrasound  shows a large right inguinal hernia containing several loops of intestine.  Risk, benefits and alternatives were discussed preoperatively and he agrees to the operation.        DESCRIPTION OF PROCEDURE:    The patient was taken to the operating room,  placed supine on the operating table.  Time-out was established.  General anesthesia was induced.  He was given antibiotics.  A Chaves catheter was placed that was later removed after the procedure.  The abdomen was prepped and draped in a sterile fashion.  We made a supraumbilical midline incision, placed a 8 mm Adelita trocar.  We established insufflation. We placed 8 mm ports in the left and right upper abdominal wall respectively.  The patient had been placed in the Trendelenburg position.  We then docked the robot to our ports.   I scrubbed out and performed the operation from a console in the corner of the room.       He was noted to have a massive  indirect right inguinal hernia.  This was incarcerated with the terminal ileum, appendix and cecum.  This bowel reduced very nicely.    There was no obvious left inguinal hernia.   We scored the peritoneum just below the right inguinal ligament and very carefully took the peritoneum down from the right lower abdominal wall.  With very careful dissection we reduced the hernia sac and isolated the cord structures.  This was made difficult due to the large sized of the sac.   There was no obvious direct component.  The entire myopectineal orifice was exposed.  We then introduced an extra  large right-sided 3DMax mesh through our camera port, positioned this over the hernia defect, and secured it into place using interrupted  3-0 Vicryl stitches.  Sutures were placed anteriorly, laterally and at coopers ligament.  Next we re-peritonealized our repair by sewing the peritoneum up over the mesh using a 3-0 absorbable V-Loc suture.      We then removed our ports and the incisions infiltrated with 1% marcaine 0.05% lidocaine solution.  Next we closed our midline fascial defect using 0 Vicryl suture placed in a figure of eight fashion.  Skin was reapproximated using 3-0 and 4-0 subcuticular Vicryl stitches followed by Dermabond glue.  The patient tolerated the procedure  without difficulty and was returned to the recovery room in stable condition.         Complications:  None; patient tolerated the procedure well.    Disposition: PACU - hemodynamically stable.  Condition: stable           Attending Attestation: I performed the procedure.    Brian Bates  Phone Number: [phone]

## 2025-04-04 ASSESSMENT — PAIN SCALES - GENERAL: PAINLEVEL_OUTOF10: 4

## 2025-04-09 ENCOUNTER — APPOINTMENT (OUTPATIENT)
Dept: PRIMARY CARE | Facility: CLINIC | Age: 75
End: 2025-04-09
Payer: MEDICARE

## 2025-04-09 VITALS
WEIGHT: 239 LBS | SYSTOLIC BLOOD PRESSURE: 160 MMHG | DIASTOLIC BLOOD PRESSURE: 90 MMHG | BODY MASS INDEX: 36.34 KG/M2 | HEART RATE: 100 BPM

## 2025-04-09 DIAGNOSIS — E66.01 OBESITY, MORBID (MULTI): Primary | ICD-10-CM

## 2025-04-09 DIAGNOSIS — K40.90 INGUINAL HERNIA WITHOUT OBSTRUCTION OR GANGRENE, RECURRENCE NOT SPECIFIED, UNSPECIFIED LATERALITY: ICD-10-CM

## 2025-04-09 DIAGNOSIS — R60.9 EDEMA, UNSPECIFIED TYPE: ICD-10-CM

## 2025-04-09 DIAGNOSIS — E78.2 MIXED HYPERLIPIDEMIA: ICD-10-CM

## 2025-04-09 DIAGNOSIS — E89.0 HYPOTHYROIDISM, POSTRADIOIODINE THERAPY: ICD-10-CM

## 2025-04-09 DIAGNOSIS — I10 BENIGN HYPERTENSION: ICD-10-CM

## 2025-04-09 DIAGNOSIS — R97.20 ELEVATED PSA: ICD-10-CM

## 2025-04-09 PROCEDURE — 3080F DIAST BP >= 90 MM HG: CPT | Performed by: INTERNAL MEDICINE

## 2025-04-09 PROCEDURE — 1160F RVW MEDS BY RX/DR IN RCRD: CPT | Performed by: INTERNAL MEDICINE

## 2025-04-09 PROCEDURE — 1159F MED LIST DOCD IN RCRD: CPT | Performed by: INTERNAL MEDICINE

## 2025-04-09 PROCEDURE — 3077F SYST BP >= 140 MM HG: CPT | Performed by: INTERNAL MEDICINE

## 2025-04-09 PROCEDURE — 1036F TOBACCO NON-USER: CPT | Performed by: INTERNAL MEDICINE

## 2025-04-09 PROCEDURE — G2211 COMPLEX E/M VISIT ADD ON: HCPCS | Performed by: INTERNAL MEDICINE

## 2025-04-09 PROCEDURE — 99214 OFFICE O/P EST MOD 30 MIN: CPT | Performed by: INTERNAL MEDICINE

## 2025-04-09 PROCEDURE — 93000 ELECTROCARDIOGRAM COMPLETE: CPT | Performed by: INTERNAL MEDICINE

## 2025-04-09 RX ORDER — CHLORTHALIDONE 25 MG/1
25 TABLET ORAL DAILY
Qty: 30 TABLET | Refills: 5 | Status: SHIPPED | OUTPATIENT
Start: 2025-04-09 | End: 2025-10-06

## 2025-04-09 ASSESSMENT — ENCOUNTER SYMPTOMS
POLYDIPSIA: 0
NECK PAIN: 0
EYE ITCHING: 0
PHOTOPHOBIA: 0
JOINT SWELLING: 0
TREMORS: 0
COLOR CHANGE: 0
CHILLS: 0
RHINORRHEA: 0
APPETITE CHANGE: 0
WEAKNESS: 0
VOICE CHANGE: 0
DYSURIA: 0
SLEEP DISTURBANCE: 0
FREQUENCY: 0
BLOOD IN STOOL: 0
ABDOMINAL DISTENTION: 0
SHORTNESS OF BREATH: 0
ACTIVITY CHANGE: 0
MYALGIAS: 0
ADENOPATHY: 0
EYE REDNESS: 0
DIFFICULTY URINATING: 0
CHOKING: 0
LIGHT-HEADEDNESS: 0
TROUBLE SWALLOWING: 0
NAUSEA: 0
DIARRHEA: 0
FACIAL SWELLING: 0
CONSTIPATION: 0
CHEST TIGHTNESS: 0
FACIAL ASYMMETRY: 0
ARTHRALGIAS: 0
RECTAL PAIN: 0
VOMITING: 0
COUGH: 0
BACK PAIN: 0
HEMATURIA: 0
SEIZURES: 0
PALPITATIONS: 0
EYE DISCHARGE: 0
SORE THROAT: 0
POLYPHAGIA: 0
DIZZINESS: 0
SINUS PAIN: 0
FATIGUE: 0
WOUND: 0
BRUISES/BLEEDS EASILY: 0
NUMBNESS: 0
ANAL BLEEDING: 0
WHEEZING: 0
SPEECH DIFFICULTY: 0
FLANK PAIN: 0
ABDOMINAL PAIN: 0
DIAPHORESIS: 0
EYE PAIN: 0
HEADACHES: 0
SINUS PRESSURE: 0
NECK STIFFNESS: 0
STRIDOR: 0

## 2025-04-09 ASSESSMENT — PATIENT HEALTH QUESTIONNAIRE - PHQ9
1. LITTLE INTEREST OR PLEASURE IN DOING THINGS: NOT AT ALL
2. FEELING DOWN, DEPRESSED OR HOPELESS: NOT AT ALL
SUM OF ALL RESPONSES TO PHQ9 QUESTIONS 1 AND 2: 0

## 2025-04-09 NOTE — PROGRESS NOTES
Subjective   Patient ID: Wil Dinero is a 74 y.o. male who presents for Follow-up (Dental clearance).    Patient presents for follow-up.  He is scheduled to have dental procedure done.  He needs a preoperative clearance.  He is status post inguinal hernia repair last week.  He reports that his blood pressure has been mildly elevated at home.  He denies any headaches, no dizziness, no chest pain or shortness of breath.  He denies any palpitations.  He denies abdominal pain no nausea vomiting or diarrhea.         Review of Systems   Constitutional:  Negative for activity change, appetite change, chills, diaphoresis and fatigue.   HENT:  Negative for congestion, dental problem, drooling, ear discharge, ear pain, facial swelling, hearing loss, mouth sores, nosebleeds, postnasal drip, rhinorrhea, sinus pressure, sinus pain, sneezing, sore throat, tinnitus, trouble swallowing and voice change.    Eyes:  Negative for photophobia, pain, discharge, redness, itching and visual disturbance.   Respiratory:  Negative for cough, choking, chest tightness, shortness of breath, wheezing and stridor.    Cardiovascular:  Negative for chest pain, palpitations and leg swelling.   Gastrointestinal:  Negative for abdominal distention, abdominal pain, anal bleeding, blood in stool, constipation, diarrhea, nausea, rectal pain and vomiting.   Endocrine: Negative for cold intolerance, heat intolerance, polydipsia, polyphagia and polyuria.   Genitourinary:  Negative for decreased urine volume, difficulty urinating, dysuria, enuresis, flank pain, frequency, genital sores, hematuria and urgency.   Musculoskeletal:  Negative for arthralgias, back pain, gait problem, joint swelling, myalgias, neck pain and neck stiffness.   Skin:  Negative for color change, pallor, rash and wound.   Neurological:  Negative for dizziness, tremors, seizures, syncope, facial asymmetry, speech difficulty, weakness, light-headedness, numbness and headaches.    Hematological:  Negative for adenopathy. Does not bruise/bleed easily.   Psychiatric/Behavioral:  Negative for sleep disturbance.        Objective   Wt 108 kg (239 lb)   BMI 36.34 kg/m²     Physical Exam  Constitutional:       Appearance: Normal appearance.   Cardiovascular:      Rate and Rhythm: Normal rate and regular rhythm.      Heart sounds: No murmur heard.     No gallop.   Pulmonary:      Effort: No respiratory distress.      Breath sounds: No wheezing or rales.   Abdominal:      General: There is no distension.      Palpations: There is no mass.      Tenderness: There is no abdominal tenderness. There is no guarding.   Musculoskeletal:      Right lower leg: Edema (1 + edema) present.      Left lower leg: Edema (2 + edema) present.   Neurological:      Mental Status: He is alert.         Assessment/Plan   Diagnoses and all orders for this visit:  Obesity, morbid (Multi)-diet and exercise  Benign hypertension-start chlorthalidone.  Recheck blood pressure next week.  -     chlorthalidone (Hygroton) 25 mg tablet; Take 1 tablet (25 mg) by mouth once daily.  -     ECG 12 lead (Clinic Performed)  Mixed [id number]-[id number] with endocrinology will continue with statin-  Hypothyroidism,-follow-up with endocrinology  Elevated PSA- he agrees to schedule appointment with urology  Inguinal hernia without obstruction or gangrene, recurrence not specified, unspecified [id number]-[id number] with surgery clinic  Edema, unspecified type-he will take chlorthalidone as prescribed  Polyp-he agrees to schedule colonoscopy.  Health maintenance-he does not want shingles vaccine.  He will get a tetanus shot at the pharmacy.  He will  schedule colonoscopy.

## 2025-04-09 NOTE — PATIENT INSTRUCTIONS
Please take medication as prescribed.  Follow-up in 1 week.  Schedule appointment with urology, GI for your colonoscopy and schedule your cardiac score.  Monitor your blood pressure at home.

## 2025-04-16 ENCOUNTER — APPOINTMENT (OUTPATIENT)
Dept: PRIMARY CARE | Facility: CLINIC | Age: 75
End: 2025-04-16
Payer: MEDICARE

## 2025-04-21 ENCOUNTER — OFFICE VISIT (OUTPATIENT)
Dept: SURGERY | Facility: CLINIC | Age: 75
End: 2025-04-21
Payer: MEDICARE

## 2025-04-21 VITALS
WEIGHT: 239 LBS | DIASTOLIC BLOOD PRESSURE: 85 MMHG | BODY MASS INDEX: 36.34 KG/M2 | SYSTOLIC BLOOD PRESSURE: 156 MMHG | HEART RATE: 110 BPM

## 2025-04-21 DIAGNOSIS — Z09 FOLLOW-UP EXAM: Primary | ICD-10-CM

## 2025-04-21 PROCEDURE — 3079F DIAST BP 80-89 MM HG: CPT | Performed by: SURGERY

## 2025-04-21 PROCEDURE — 1126F AMNT PAIN NOTED NONE PRSNT: CPT | Performed by: SURGERY

## 2025-04-21 PROCEDURE — 1036F TOBACCO NON-USER: CPT | Performed by: SURGERY

## 2025-04-21 PROCEDURE — 99211 OFF/OP EST MAY X REQ PHY/QHP: CPT | Performed by: SURGERY

## 2025-04-21 PROCEDURE — 3077F SYST BP >= 140 MM HG: CPT | Performed by: SURGERY

## 2025-04-21 ASSESSMENT — PAIN SCALES - GENERAL: PAINLEVEL_OUTOF10: 0-NO PAIN

## 2025-04-21 ASSESSMENT — ENCOUNTER SYMPTOMS
LOSS OF SENSATION IN FEET: 0
OCCASIONAL FEELINGS OF UNSTEADINESS: 0
DEPRESSION: 0

## 2025-04-21 NOTE — PROGRESS NOTES
Mr. Wil Dinero is a 74 y.o. year old male who comes in today for follow-up after undergoing robotic repair of a  Massive Right  Inguinal hernia .  This procedure was performed on 4/3/25 for a large right inguinal hernia containing loops of small and large intestine    Patient is doing very well and is pleased with the outcome of the surgery.    Tolerating a regular diet, bowel movements are normal    On exam patient looks well    Incisions are healing very nicely. Large seroma extending down to the scrotum.     Impression: Doing well following  robotic repair of a  Massive Right  Inguinal hernia    Has large seroma that would be expected following repair of such a large hernia.  Reassured that this will resorb     Discussed increasing activity level    Follow-up with me as needed

## 2025-04-21 NOTE — LETTER
April 21, 2025     Fletcher Estrella MD  [address] [facility] [address]    Patient: Wil Dinero   YOB: 1950   Date of Visit: 4/21/2025       Dear Dr. Fletcher Estrella MD:    Thank you for referring Wil Dinero to me for evaluation. Below are my notes for this consultation.  If you have questions, please do not hesitate to call me. I look forward to following your patient along with you.       Sincerely,     Brian Bates MD      CC: No Recipients  ______________________________________________________________________________________       Mr. Wil Dinero is a 74 y.o. year old male who comes in today for follow-up after undergoing robotic repair of a  Massive Right  Inguinal hernia .  This procedure was performed on 4/3/25 for a large right inguinal hernia containing loops of small and large intestine    Patient is doing very well and is pleased with the outcome of the surgery.    Tolerating a regular diet, bowel movements are normal    On exam patient looks well    Incisions are healing very nicely. Large seroma extending down to the scrotum.     Impression: Doing well following  robotic repair of a  Massive Right  Inguinal hernia    Has large seroma that would be expected following repair of such a large hernia.  Reassured that this will resorb     Discussed increasing activity level    Follow-up with me as needed

## 2025-04-23 ENCOUNTER — APPOINTMENT (OUTPATIENT)
Dept: PRIMARY CARE | Facility: CLINIC | Age: 75
End: 2025-04-23
Payer: MEDICARE

## 2025-05-01 DIAGNOSIS — E89.0 POSTABLATIVE HYPOTHYROIDISM: ICD-10-CM

## 2025-07-17 LAB — TSH SERPL-ACNC: 1.78 MIU/L (ref 0.4–4.5)

## 2025-07-21 ENCOUNTER — OFFICE VISIT (OUTPATIENT)
Dept: SURGERY | Facility: CLINIC | Age: 75
End: 2025-07-21
Payer: MEDICARE

## 2025-07-21 VITALS
DIASTOLIC BLOOD PRESSURE: 75 MMHG | HEART RATE: 93 BPM | HEIGHT: 70 IN | SYSTOLIC BLOOD PRESSURE: 144 MMHG | BODY MASS INDEX: 32.78 KG/M2 | WEIGHT: 229 LBS

## 2025-07-21 DIAGNOSIS — Z09 FOLLOW-UP EXAM: Primary | ICD-10-CM

## 2025-07-21 PROCEDURE — 1159F MED LIST DOCD IN RCRD: CPT | Performed by: SURGERY

## 2025-07-21 PROCEDURE — 3078F DIAST BP <80 MM HG: CPT | Performed by: SURGERY

## 2025-07-21 PROCEDURE — 3008F BODY MASS INDEX DOCD: CPT | Performed by: SURGERY

## 2025-07-21 PROCEDURE — 1126F AMNT PAIN NOTED NONE PRSNT: CPT | Performed by: SURGERY

## 2025-07-21 PROCEDURE — 1036F TOBACCO NON-USER: CPT | Performed by: SURGERY

## 2025-07-21 PROCEDURE — 3077F SYST BP >= 140 MM HG: CPT | Performed by: SURGERY

## 2025-07-21 PROCEDURE — 99211 OFF/OP EST MAY X REQ PHY/QHP: CPT | Performed by: SURGERY

## 2025-07-21 ASSESSMENT — ENCOUNTER SYMPTOMS
LOSS OF SENSATION IN FEET: 0
OCCASIONAL FEELINGS OF UNSTEADINESS: 0
DEPRESSION: 0

## 2025-07-21 ASSESSMENT — PAIN SCALES - GENERAL: PAINLEVEL_OUTOF10: 0-NO PAIN

## 2025-07-21 NOTE — LETTER
July 21, 2025     Fletcher Estrella MD  [address] [facility] [id number]    Patient: Wil Dinero   YOB: 1950   Date of Visit: 7/21/2025       Dear Dr. Fletcher Estrella MD:    Thank you for referring Wil Dinero to me for evaluation. Below are my notes for this consultation.  If you have questions, please do not hesitate to call me. I look forward to following your patient along with you.       Sincerely,     Brian Bates MD      CC: No Recipients  ______________________________________________________________________________________       Mr. Wil Dinero is a 75 y.o. year old male who comes in today for follow-up after undergoing robotic repair of a  Massive Right  Inguinal hernia .  This procedure was performed on 4/3/25 for a large right inguinal hernia containing loops of small and large intestine    Patient is doing very well.  Comes in for final wound check.  At his previous visit was noted to have right groin seroma which would have been expected.    Tolerating a regular diet, bowel movements are normal    On exam patient looks well    Incisions are healing very nicely.  Seroma just about resolved    Impression: Doing well following  robotic repair of a  Massive Right  Inguinal hernia    Had a  large seroma that is just about  resorbed        Follow-up with me as needed

## 2025-07-21 NOTE — PROGRESS NOTES
Mr. Wil Dinero is a 75 y.o. year old male who comes in today for follow-up after undergoing robotic repair of a  Massive Right  Inguinal hernia .  This procedure was performed on 4/3/25 for a large right inguinal hernia containing loops of small and large intestine    Patient is doing very well.  Comes in for final wound check.  At his previous visit was noted to have right groin seroma which would have been expected.    Tolerating a regular diet, bowel movements are normal    On exam patient looks well    Incisions are healing very nicely.  Seroma just about resolved    Impression: Doing well following  robotic repair of a  Massive Right  Inguinal hernia    Had a  large seroma that is just about  resorbed        Follow-up with me as needed

## 2025-08-01 DIAGNOSIS — I10 BENIGN HYPERTENSION: ICD-10-CM

## 2025-08-01 RX ORDER — CHLORTHALIDONE 25 MG/1
25 TABLET ORAL DAILY
Qty: 90 TABLET | Refills: 3 | Status: SHIPPED | OUTPATIENT
Start: 2025-08-01

## 2025-08-05 ENCOUNTER — PATIENT MESSAGE (OUTPATIENT)
Dept: ENDOCRINOLOGY | Facility: CLINIC | Age: 75
End: 2025-08-05
Payer: MEDICARE

## 2025-08-05 DIAGNOSIS — E89.0 POSTABLATIVE HYPOTHYROIDISM: ICD-10-CM

## 2025-08-05 DIAGNOSIS — J30.9 ALLERGIC RHINITIS, UNSPECIFIED SEASONALITY, UNSPECIFIED TRIGGER: ICD-10-CM

## 2025-08-05 RX ORDER — LEVOTHYROXINE SODIUM 75 UG/1
TABLET ORAL
Qty: 90 TABLET | Refills: 1 | Status: SHIPPED | OUTPATIENT
Start: 2025-08-05

## 2026-02-10 ENCOUNTER — APPOINTMENT (OUTPATIENT)
Dept: ENDOCRINOLOGY | Facility: CLINIC | Age: 76
End: 2026-02-10
Payer: MEDICARE

## (undated) DEVICE — TRAY, FOLEY, LUBRI-SIL, 16FR, COMPLETE CARE W/STATLOCK

## (undated) DEVICE — TROCAR SYSTEM, BALLOON, KII GELPORT, 12 X 100MM

## (undated) DEVICE — ADHESIVE, SKIN, DERMABOND ADVANCED, 15CM, PEN-STYLE

## (undated) DEVICE — SCISSORS, MONOPOLAR, CURVED, 8MM

## (undated) DEVICE — DRAPE, COLUMN, DAVINCI XI

## (undated) DEVICE — FORCEPS, CADIERE, DAVINCI XI

## (undated) DEVICE — FORCEPS, PROGRASP, DAVINCI XI

## (undated) DEVICE — SEAL, UNIVERSAL, 5-12MM

## (undated) DEVICE — LUBRICANT, ELECTROLUBE, F/ELECTRODE TIPS

## (undated) DEVICE — DRIVER, MEGA NEEDLE

## (undated) DEVICE — SUTURE, STRATAFIX, 3-0 SPIRAL PDS PLUS, 15CM SH VIOLET

## (undated) DEVICE — SUTURE, VICRYL, 0, 27 IN, UR-6, VIOLET

## (undated) DEVICE — TIP,  ELECTRODE COATED INSULATED, EXTENDED, LF

## (undated) DEVICE — DRAPE, SHEET, THREE QUARTER, FAN FOLD, 57 X 77 IN

## (undated) DEVICE — DRAPE, ARM XI

## (undated) DEVICE — APPLICATOR, CHLORAPREP, W/ORANGE TINT, 26ML

## (undated) DEVICE — CLEAN KIT, ANTIFOG SCOPE, SEE SHARP 150MM

## (undated) DEVICE — SUTURE, MONOCRYL, 4-0, 18 IN, PS2, UNDYED

## (undated) DEVICE — GLOVE, SURGICAL, PROTEXIS PI BLUE W/NEUTHERA, 8.0, PF, LF

## (undated) DEVICE — TUBE SET, PNEUMOLAR HEATED, SMOKE EVACU, HIGH-FLOW

## (undated) DEVICE — GLOVE, SURGICAL, PROTEXIS PI W/NEU-THERA, 7.5, PF, LF

## (undated) DEVICE — COVER, TIP HOT SHEARS ENDOWRIST

## (undated) DEVICE — OBTURATOR, BLADELESS , SU

## (undated) DEVICE — Device

## (undated) DEVICE — SHEAR, W/UNIPOLAR CAUTERY, ENDOSHEAR, 5 MM